# Patient Record
Sex: FEMALE | Race: WHITE | NOT HISPANIC OR LATINO | RURAL
[De-identification: names, ages, dates, MRNs, and addresses within clinical notes are randomized per-mention and may not be internally consistent; named-entity substitution may affect disease eponyms.]

---

## 2022-11-05 ENCOUNTER — HOSPITAL ENCOUNTER (EMERGENCY)
Facility: HOSPITAL | Age: 87
Discharge: HOME | End: 2022-11-05
Attending: EMERGENCY MEDICINE
Payer: MEDICARE

## 2022-11-05 VITALS
OXYGEN SATURATION: 97 % | BODY MASS INDEX: 27.23 KG/M2 | TEMPERATURE: 98 F | RESPIRATION RATE: 20 BRPM | DIASTOLIC BLOOD PRESSURE: 65 MMHG | SYSTOLIC BLOOD PRESSURE: 162 MMHG | WEIGHT: 148 LBS | HEIGHT: 62 IN | HEART RATE: 63 BPM

## 2022-11-05 DIAGNOSIS — S39.012A LUMBAR STRAIN, INITIAL ENCOUNTER: ICD-10-CM
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
## 2022-11-05 DIAGNOSIS — S80.10XA CONTUSION OF LOWER EXTREMITY, UNSPECIFIED LATERALITY, INITIAL ENCOUNTER: ICD-10-CM

## 2022-11-05 PROCEDURE — 99285 EMERGENCY DEPT VISIT HI MDM: CPT | Mod: 25

## 2022-11-05 PROCEDURE — 90715 TDAP VACCINE 7 YRS/> IM: CPT | Performed by: EMERGENCY MEDICINE

## 2022-11-05 PROCEDURE — 99283 EMERGENCY DEPT VISIT LOW MDM: CPT | Mod: ,,, | Performed by: EMERGENCY MEDICINE

## 2022-11-05 PROCEDURE — 99283 PR EMERGENCY DEPT VISIT,LEVEL III: ICD-10-PCS | Mod: ,,, | Performed by: EMERGENCY MEDICINE

## 2022-11-05 PROCEDURE — 63600175 PHARM REV CODE 636 W HCPCS: Performed by: EMERGENCY MEDICINE

## 2022-11-05 PROCEDURE — 25000003 PHARM REV CODE 250: Performed by: EMERGENCY MEDICINE

## 2022-11-05 PROCEDURE — 90471 IMMUNIZATION ADMIN: CPT | Performed by: EMERGENCY MEDICINE

## 2022-11-05 RX ADMIN — Medication 1 EACH: at 06:11

## 2022-11-05 RX ADMIN — TETANUS TOXOID, REDUCED DIPHTHERIA TOXOID AND ACELLULAR PERTUSSIS VACCINE, ADSORBED 0.5 ML: 5; 2.5; 8; 8; 2.5 SUSPENSION INTRAMUSCULAR at 05:11

## 2022-11-05 NOTE — DISCHARGE INSTRUCTIONS
TAKE IBUPROFEN OR ACETAMINOPHEN AS DIRECTED FOR PAIN.  FOLLOW UP IF SYMPTOMS PERSIST OR WORSEN OR OTHERWISE AS NEEDED.

## 2022-11-05 NOTE — ED TRIAGE NOTES
Fell on a rack at [place]. Pt is c/o left lower leg pain, back pain & a broken nail to the left thumb.

## 2022-11-05 NOTE — ED PROVIDER NOTES
Encounter Date: 11/5/2022    SCRIBE #1 NOTE: I, Vicky Roth, am scribing for, and in the presence of,  Good Rothman MD. I have scribed the entire note.     History     Chief Complaint   Patient presents with    Fall     89 y.o. female presents to the emergency department with complaints of mild back pain and a left leg abrasion. Patient stated she was in [place] when she tripped and fell on top of a rack. Patient is a diabetic. No other symptoms were reported.     The history is provided by the patient and a relative.   Review of patient's allergies indicates:  No Known Allergies  No past medical history on file.  No past surgical history on file.  No family history on file.     Review of Systems   Constitutional: Negative.    HENT: Negative.     Eyes: Negative.    Respiratory: Negative.     Cardiovascular: Negative.    Gastrointestinal: Negative.    Endocrine: Negative.    Genitourinary: Negative.    Musculoskeletal:  Positive for back pain (mild).   Skin:  Positive for wound (abrasion).   Allergic/Immunologic: Negative.    Neurological: Negative.    Hematological: Negative.    Psychiatric/Behavioral: Negative.     All other systems reviewed and are negative.    Physical Exam     Initial Vitals [11/05/22 1651]   BP Pulse Resp Temp SpO2   (!) 162/65 63 20 98.4 °F (36.9 °C) 97 %      MAP       --         Physical Exam    Vitals reviewed.  Constitutional: She appears well-developed and well-nourished. No distress.   HENT:   Head: Normocephalic.   Eyes: Conjunctivae are normal. Pupils are equal, round, and reactive to light.   Cardiovascular:  Normal rate, regular rhythm, normal heart sounds and intact distal pulses.           Pulmonary/Chest: Breath sounds normal.   Abdominal: Abdomen is soft. Bowel sounds are normal.   Musculoskeletal:      Thoracic back: Tenderness (mild to midline) present.      Comments: Swollen bruised area on left lower leg anteriorly  1x2cm skin tear       Neurological: She is alert and  oriented to person, place, and time.   Skin: Skin is warm and dry.   Psychiatric: She has a normal mood and affect.       ED Course   Procedures  Labs Reviewed - No data to display       Imaging Results              X-Ray Lumbar Spine Ap And Lateral (Final result)  Result time 11/05/22 17:43:13      Final result by Ned Chaves II, MD (11/05/22 17:43:13)                   Impression:      T12 and L4 compression fractures as described above.      Electronically signed by: Ned Chaves  Date:    11/05/2022  Time:    17:43               Narrative:    EXAMINATION:  XR LUMBAR SPINE AP AND LATERAL    CLINICAL HISTORY:  FALL;    COMPARISON:  None.    FINDINGS:  Compression fracture L4 present with height loss estimated 25%.  There is compression fracture of T12 estimated 25% height loss.  No other fracture is seen.  Remaining vertebral body heights and alignment are normal.  There is loss of disc space and degenerative change mild at multiple levels..  Moderate lower lumbar spine facet joint degenerative change is present.                                       CT Head Without Contrast (Final result)  Result time 11/05/22 17:38:14      Final result by Ned Chaves II, MD (11/05/22 17:38:14)                   Impression:      No evidence of acute injury or other acute process demonstrated.      Electronically signed by: Ned Chaves  Date:    11/05/2022  Time:    17:38               Narrative:    EXAMINATION:  CT HEAD WITHOUT CONTRAST    CLINICAL HISTORY:  Head trauma, moderate-severe;    TECHNIQUE:  Axial CT imaging of the brain is performed without contrast with 3 mm increments.    CT dose reduction technique used - Dose Rite and tube current modulation.    COMPARISON:  None available    FINDINGS:  No evidence of hemorrhage,  mass,  mass effect,  midline shift or acute infarct seen.  There is moderate diffuse cerebral atrophy.  There are areas of decreased density seen within the white matter.  Otherwise  the brain parenchyma attenuation and differentiation appears within normal limits. The ventricles and cisterns are normal in caliber.  No cranial or skull base abnormality is identified.                                       Medications   bacitracin-polymyxin B 500-10,000 unit/gram ointment (has no administration in time range)   Tdap (BOOSTRIX) vaccine injection 0.5 mL (0.5 mLs Intramuscular Given 11/5/22 2210)                Attending Attestation:           Physician Attestation for Scribe:  Physician Attestation Statement for Scribe #1: I, Good Rothman MD, reviewed documentation, as scribed by Vicky Roth in my presence, and it is both accurate and complete.                        Clinical Impression:   Final diagnoses:  [W19.XXXA] Fall, initial encounter (Primary)  [S39.012A] Lumbar strain, initial encounter  [S80.10XA] Contusion of lower extremity, unspecified laterality, initial encounter      ED Disposition Condition    Discharge Stable          ED Prescriptions    None       Follow-up Information       Follow up With Specialties Details Why Contact Info    PRIMARY CARE PROVIDER   As needed              Good Rothman MD  11/05/22 4882

## 2024-10-27 ENCOUNTER — HOSPITAL ENCOUNTER (INPATIENT)
Facility: HOSPITAL | Age: 89
LOS: 5 days | Discharge: NURSING, REHAB OR LONG-TERM CARE | DRG: 511 | End: 2024-11-01
Attending: EMERGENCY MEDICINE | Admitting: INTERNAL MEDICINE
Payer: MEDICARE

## 2024-10-27 DIAGNOSIS — S52.502A CLOSED FRACTURE OF DISTAL END OF LEFT RADIUS, UNSPECIFIED FRACTURE MORPHOLOGY, INITIAL ENCOUNTER: Primary | ICD-10-CM

## 2024-10-27 DIAGNOSIS — I25.810 CORONARY ARTERY DISEASE INVOLVING CORONARY BYPASS GRAFT OF NATIVE HEART WITHOUT ANGINA PECTORIS: ICD-10-CM

## 2024-10-27 DIAGNOSIS — S52.122B TYPE I OR II OPEN DISPLACED FRACTURE OF HEAD OF LEFT RADIUS, INITIAL ENCOUNTER: ICD-10-CM

## 2024-10-27 DIAGNOSIS — E11.65 TYPE 2 DIABETES MELLITUS WITH HYPERGLYCEMIA, WITHOUT LONG-TERM CURRENT USE OF INSULIN: ICD-10-CM
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
## 2024-10-27 DIAGNOSIS — Z01.818 PRE-OP EVALUATION: ICD-10-CM

## 2024-10-27 DIAGNOSIS — T14.90XA INJURY: ICD-10-CM

## 2024-10-27 DIAGNOSIS — I25.10 CAD (CORONARY ARTERY DISEASE): ICD-10-CM

## 2024-10-27 DIAGNOSIS — R07.9 CHEST PAIN: ICD-10-CM

## 2024-10-27 DIAGNOSIS — I10 PRIMARY HYPERTENSION: ICD-10-CM

## 2024-10-27 DIAGNOSIS — S52.90XA RADIAL FRACTURE: ICD-10-CM

## 2024-10-27 PROBLEM — S52.92XA FRACTURE OF LEFT RADIUS: Status: ACTIVE | Noted: 2024-10-27

## 2024-10-27 PROBLEM — E11.9 TYPE 2 DIABETES MELLITUS, WITHOUT LONG-TERM CURRENT USE OF INSULIN: Status: ACTIVE | Noted: 2024-10-27

## 2024-10-27 PROBLEM — S52.202A FRACTURE OF LEFT ULNA: Status: ACTIVE | Noted: 2024-10-27

## 2024-10-27 LAB
ALBUMIN SERPL BCP-MCNC: 3.3 G/DL (ref 3.5–5)
ALBUMIN/GLOB SERPL: 1 {RATIO}
ALP SERPL-CCNC: 87 U/L (ref 55–142)
ALT SERPL W P-5'-P-CCNC: 19 U/L (ref 13–56)
ANION GAP SERPL CALCULATED.3IONS-SCNC: 7 MMOL/L (ref 7–16)
APTT PPP: 31.3 SECONDS (ref 25.2–37.3)
AST SERPL W P-5'-P-CCNC: 22 U/L (ref 15–37)
BASOPHILS # BLD AUTO: 0.01 K/UL (ref 0–0.2)
BASOPHILS NFR BLD AUTO: 0.2 % (ref 0–1)
BILIRUB SERPL-MCNC: 0.5 MG/DL (ref ?–1.2)
BILIRUB UR QL STRIP: NEGATIVE
BUN SERPL-MCNC: 17 MG/DL (ref 7–18)
BUN/CREAT SERPL: 16 (ref 6–20)
CALCIUM SERPL-MCNC: 8.8 MG/DL (ref 8.5–10.1)
CHLORIDE SERPL-SCNC: 102 MMOL/L (ref 98–107)
CLARITY UR: ABNORMAL
CO2 SERPL-SCNC: 32 MMOL/L (ref 21–32)
COLOR UR: COLORLESS
CREAT SERPL-MCNC: 1.08 MG/DL (ref 0.55–1.02)
DIFFERENTIAL METHOD BLD: ABNORMAL
EGFR (NO RACE VARIABLE) (RUSH/TITUS): 48 ML/MIN/1.73M2
EOSINOPHIL # BLD AUTO: 0.02 K/UL (ref 0–0.5)
EOSINOPHIL NFR BLD AUTO: 0.3 % (ref 1–4)
ERYTHROCYTE [DISTWIDTH] IN BLOOD BY AUTOMATED COUNT: 12.6 % (ref 11.5–14.5)
GLOBULIN SER-MCNC: 3.2 G/DL (ref 2–4)
GLUCOSE SERPL-MCNC: 300 MG/DL (ref 74–106)
GLUCOSE UR STRIP-MCNC: 1000 MG/DL
HCT VFR BLD AUTO: 36.7 % (ref 38–47)
HGB BLD-MCNC: 11.7 G/DL (ref 12–16)
IMM GRANULOCYTES # BLD AUTO: 0.02 K/UL (ref 0–0.04)
IMM GRANULOCYTES NFR BLD: 0.3 % (ref 0–0.4)
INR BLD: 1.1
KETONES UR STRIP-SCNC: NEGATIVE MG/DL
LEUKOCYTE ESTERASE UR QL STRIP: NEGATIVE
LYMPHOCYTES # BLD AUTO: 0.97 K/UL (ref 1–4.8)
LYMPHOCYTES NFR BLD AUTO: 15.8 % (ref 27–41)
MCH RBC QN AUTO: 28.5 PG (ref 27–31)
MCHC RBC AUTO-ENTMCNC: 31.9 G/DL (ref 32–36)
MCV RBC AUTO: 89.3 FL (ref 80–96)
MONOCYTES # BLD AUTO: 0.32 K/UL (ref 0–0.8)
MONOCYTES NFR BLD AUTO: 5.2 % (ref 2–6)
MPC BLD CALC-MCNC: 10 FL (ref 9.4–12.4)
NEUTROPHILS # BLD AUTO: 4.81 K/UL (ref 1.8–7.7)
NEUTROPHILS NFR BLD AUTO: 78.2 % (ref 53–65)
NITRITE UR QL STRIP: NEGATIVE
NRBC # BLD AUTO: 0 X10E3/UL
NRBC, AUTO (.00): 0 %
PH UR STRIP: 8 PH UNITS
PLATELET # BLD AUTO: 139 K/UL (ref 150–400)
POTASSIUM SERPL-SCNC: 4.6 MMOL/L (ref 3.5–5.1)
PROT SERPL-MCNC: 6.5 G/DL (ref 6.4–8.2)
PROT UR QL STRIP: 20
PROTHROMBIN TIME: 14.1 SECONDS (ref 11.7–14.7)
RBC # BLD AUTO: 4.11 M/UL (ref 4.2–5.4)
RBC # UR STRIP: NEGATIVE /UL
SODIUM SERPL-SCNC: 136 MMOL/L (ref 136–145)
SP GR UR STRIP: 1.01
UROBILINOGEN UR STRIP-ACNC: NORMAL MG/DL
WBC # BLD AUTO: 6.15 K/UL (ref 4.5–11)

## 2024-10-27 PROCEDURE — 36415 COLL VENOUS BLD VENIPUNCTURE: CPT | Performed by: EMERGENCY MEDICINE

## 2024-10-27 PROCEDURE — 63600175 PHARM REV CODE 636 W HCPCS: Performed by: FAMILY MEDICINE

## 2024-10-27 PROCEDURE — 81003 URINALYSIS AUTO W/O SCOPE: CPT | Performed by: EMERGENCY MEDICINE

## 2024-10-27 PROCEDURE — 96375 TX/PRO/DX INJ NEW DRUG ADDON: CPT

## 2024-10-27 PROCEDURE — G0390 TRAUMA RESPONS W/HOSP CRITI: HCPCS | Performed by: EMERGENCY MEDICINE

## 2024-10-27 PROCEDURE — 25000003 PHARM REV CODE 250: Performed by: FAMILY MEDICINE

## 2024-10-27 PROCEDURE — 96374 THER/PROPH/DIAG INJ IV PUSH: CPT

## 2024-10-27 PROCEDURE — 99285 EMERGENCY DEPT VISIT HI MDM: CPT | Mod: 25

## 2024-10-27 PROCEDURE — 63600175 PHARM REV CODE 636 W HCPCS

## 2024-10-27 PROCEDURE — 11000001 HC ACUTE MED/SURG PRIVATE ROOM

## 2024-10-27 PROCEDURE — 93005 ELECTROCARDIOGRAM TRACING: CPT

## 2024-10-27 PROCEDURE — 85025 COMPLETE CBC W/AUTO DIFF WBC: CPT | Performed by: EMERGENCY MEDICINE

## 2024-10-27 PROCEDURE — 25000003 PHARM REV CODE 250

## 2024-10-27 PROCEDURE — 93010 ELECTROCARDIOGRAM REPORT: CPT | Mod: ,,, | Performed by: INTERNAL MEDICINE

## 2024-10-27 PROCEDURE — 99222 1ST HOSP IP/OBS MODERATE 55: CPT | Mod: AI,,, | Performed by: INTERNAL MEDICINE

## 2024-10-27 PROCEDURE — 63600175 PHARM REV CODE 636 W HCPCS: Performed by: EMERGENCY MEDICINE

## 2024-10-27 PROCEDURE — 85730 THROMBOPLASTIN TIME PARTIAL: CPT | Performed by: EMERGENCY MEDICINE

## 2024-10-27 PROCEDURE — 80053 COMPREHEN METABOLIC PANEL: CPT | Performed by: EMERGENCY MEDICINE

## 2024-10-27 PROCEDURE — 85610 PROTHROMBIN TIME: CPT | Performed by: EMERGENCY MEDICINE

## 2024-10-27 RX ORDER — IBUPROFEN 200 MG
16 TABLET ORAL
Status: DISCONTINUED | OUTPATIENT
Start: 2024-10-27 | End: 2024-11-01 | Stop reason: HOSPADM

## 2024-10-27 RX ORDER — SODIUM CHLORIDE 0.9 % (FLUSH) 0.9 %
10 SYRINGE (ML) INJECTION EVERY 12 HOURS PRN
Status: CANCELLED | OUTPATIENT
Start: 2024-10-27

## 2024-10-27 RX ORDER — PROPOFOL 10 MG/ML
50 VIAL (ML) INTRAVENOUS ONCE
Status: COMPLETED | OUTPATIENT
Start: 2024-10-27 | End: 2024-10-27

## 2024-10-27 RX ORDER — PROPOFOL 10 MG/ML
INJECTION, EMULSION INTRAVENOUS
Status: DISCONTINUED
Start: 2024-10-27 | End: 2024-10-27 | Stop reason: WASHOUT

## 2024-10-27 RX ORDER — FUROSEMIDE 20 MG/1
20 TABLET ORAL DAILY
Status: DISCONTINUED | OUTPATIENT
Start: 2024-10-28 | End: 2024-11-01 | Stop reason: HOSPADM

## 2024-10-27 RX ORDER — HYDRALAZINE HYDROCHLORIDE 25 MG/1
25 TABLET, FILM COATED ORAL 3 TIMES DAILY
Status: DISCONTINUED | OUTPATIENT
Start: 2024-10-28 | End: 2024-10-29

## 2024-10-27 RX ORDER — INSULIN ASPART 100 [IU]/ML
INJECTION, SUSPENSION SUBCUTANEOUS
COMMUNITY
Start: 2024-09-09

## 2024-10-27 RX ORDER — ONDANSETRON HYDROCHLORIDE 2 MG/ML
4 INJECTION, SOLUTION INTRAVENOUS EVERY 8 HOURS PRN
Status: DISCONTINUED | OUTPATIENT
Start: 2024-10-27 | End: 2024-11-01 | Stop reason: HOSPADM

## 2024-10-27 RX ORDER — NALOXONE HCL 0.4 MG/ML
0.02 VIAL (ML) INJECTION
Status: CANCELLED | OUTPATIENT
Start: 2024-10-27

## 2024-10-27 RX ORDER — HYDRALAZINE HYDROCHLORIDE 25 MG/1
25 TABLET, FILM COATED ORAL 3 TIMES DAILY
COMMUNITY
Start: 2024-09-16

## 2024-10-27 RX ORDER — HYDROCODONE BITARTRATE AND ACETAMINOPHEN 5; 325 MG/1; MG/1
1 TABLET ORAL
Status: COMPLETED | OUTPATIENT
Start: 2024-10-27 | End: 2024-10-27

## 2024-10-27 RX ORDER — POTASSIUM CHLORIDE 750 MG/1
10 CAPSULE, EXTENDED RELEASE ORAL DAILY
COMMUNITY
Start: 2024-09-12

## 2024-10-27 RX ORDER — CLOPIDOGREL BISULFATE 75 MG/1
75 TABLET ORAL DAILY
COMMUNITY
Start: 2024-09-05

## 2024-10-27 RX ORDER — FUROSEMIDE 20 MG/1
20 TABLET ORAL DAILY
COMMUNITY
Start: 2024-10-17

## 2024-10-27 RX ORDER — ATORVASTATIN CALCIUM 40 MG/1
40 TABLET, FILM COATED ORAL NIGHTLY
Status: DISCONTINUED | OUTPATIENT
Start: 2024-10-27 | End: 2024-11-01 | Stop reason: HOSPADM

## 2024-10-27 RX ORDER — TALC
6 POWDER (GRAM) TOPICAL NIGHTLY PRN
Status: DISCONTINUED | OUTPATIENT
Start: 2024-10-27 | End: 2024-11-01 | Stop reason: HOSPADM

## 2024-10-27 RX ORDER — INSULIN ASPART 100 [IU]/ML
0-5 INJECTION, SOLUTION INTRAVENOUS; SUBCUTANEOUS
Status: DISCONTINUED | OUTPATIENT
Start: 2024-10-27 | End: 2024-11-01 | Stop reason: HOSPADM

## 2024-10-27 RX ORDER — CARVEDILOL 12.5 MG/1
12.5 TABLET ORAL 2 TIMES DAILY
Status: DISCONTINUED | OUTPATIENT
Start: 2024-10-28 | End: 2024-11-01 | Stop reason: HOSPADM

## 2024-10-27 RX ORDER — PROPOFOL 10 MG/ML
VIAL (ML) INTRAVENOUS
Status: COMPLETED
Start: 2024-10-27 | End: 2024-10-27

## 2024-10-27 RX ORDER — GLUCAGON 1 MG
1 KIT INJECTION
Status: CANCELLED | OUTPATIENT
Start: 2024-10-27

## 2024-10-27 RX ORDER — IBUPROFEN 200 MG
16 TABLET ORAL
Status: CANCELLED | OUTPATIENT
Start: 2024-10-27

## 2024-10-27 RX ORDER — IBUPROFEN 200 MG
24 TABLET ORAL
Status: CANCELLED | OUTPATIENT
Start: 2024-10-27

## 2024-10-27 RX ORDER — CARVEDILOL 12.5 MG/1
12.5 TABLET ORAL 2 TIMES DAILY
COMMUNITY
Start: 2024-09-05

## 2024-10-27 RX ORDER — SODIUM CHLORIDE 9 MG/ML
INJECTION, SOLUTION INTRAVENOUS
Status: COMPLETED
Start: 2024-10-27 | End: 2024-10-27

## 2024-10-27 RX ORDER — ACETAMINOPHEN 325 MG/1
650 TABLET ORAL EVERY 4 HOURS PRN
Status: DISCONTINUED | OUTPATIENT
Start: 2024-10-27 | End: 2024-11-01 | Stop reason: HOSPADM

## 2024-10-27 RX ORDER — GLUCAGON 1 MG
1 KIT INJECTION
Status: DISCONTINUED | OUTPATIENT
Start: 2024-10-27 | End: 2024-11-01 | Stop reason: HOSPADM

## 2024-10-27 RX ORDER — FENTANYL CITRATE 50 UG/ML
25 INJECTION, SOLUTION INTRAMUSCULAR; INTRAVENOUS
Status: COMPLETED | OUTPATIENT
Start: 2024-10-27 | End: 2024-10-27

## 2024-10-27 RX ORDER — MORPHINE SULFATE 2 MG/ML
2 INJECTION, SOLUTION INTRAMUSCULAR; INTRAVENOUS
Status: COMPLETED | OUTPATIENT
Start: 2024-10-27 | End: 2024-10-27

## 2024-10-27 RX ORDER — ROSUVASTATIN CALCIUM 40 MG/1
20 TABLET, COATED ORAL NIGHTLY
COMMUNITY
Start: 2024-09-16

## 2024-10-27 RX ORDER — SODIUM CHLORIDE 0.9 % (FLUSH) 0.9 %
10 SYRINGE (ML) INJECTION EVERY 12 HOURS PRN
Status: DISCONTINUED | OUTPATIENT
Start: 2024-10-27 | End: 2024-11-01 | Stop reason: HOSPADM

## 2024-10-27 RX ORDER — IBUPROFEN 200 MG
24 TABLET ORAL
Status: DISCONTINUED | OUTPATIENT
Start: 2024-10-27 | End: 2024-11-01 | Stop reason: HOSPADM

## 2024-10-27 RX ORDER — BISACODYL 5 MG
5 TABLET, DELAYED RELEASE (ENTERIC COATED) ORAL DAILY PRN
COMMUNITY

## 2024-10-27 RX ORDER — NALOXONE HCL 0.4 MG/ML
0.02 VIAL (ML) INJECTION
Status: DISCONTINUED | OUTPATIENT
Start: 2024-10-27 | End: 2024-11-01 | Stop reason: HOSPADM

## 2024-10-27 RX ORDER — CEFAZOLIN 2 G/1
2 INJECTION, POWDER, FOR SOLUTION INTRAMUSCULAR; INTRAVENOUS
Status: COMPLETED | OUTPATIENT
Start: 2024-10-27 | End: 2024-10-27

## 2024-10-27 RX ORDER — ASPIRIN 81 MG/1
81 TABLET ORAL DAILY
COMMUNITY

## 2024-10-27 RX ORDER — HYDROCODONE BITARTRATE AND ACETAMINOPHEN 5; 325 MG/1; MG/1
1 TABLET ORAL EVERY 6 HOURS PRN
Status: DISCONTINUED | OUTPATIENT
Start: 2024-10-27 | End: 2024-10-28

## 2024-10-27 RX ADMIN — CEFAZOLIN 2 G: 2 INJECTION, POWDER, FOR SOLUTION INTRAMUSCULAR; INTRAVENOUS at 05:10

## 2024-10-27 RX ADMIN — HYDROCODONE BITARTRATE AND ACETAMINOPHEN 1 TABLET: 5; 325 TABLET ORAL at 07:10

## 2024-10-27 RX ADMIN — PROPOFOL 50 MG: 10 INJECTION, EMULSION INTRAVENOUS at 05:10

## 2024-10-27 RX ADMIN — FENTANYL CITRATE 25 MCG: 50 INJECTION, SOLUTION INTRAMUSCULAR; INTRAVENOUS at 06:10

## 2024-10-27 RX ADMIN — ATORVASTATIN CALCIUM 40 MG: 40 TABLET, FILM COATED ORAL at 09:10

## 2024-10-27 RX ADMIN — Medication 50 MG: at 05:10

## 2024-10-27 RX ADMIN — MORPHINE SULFATE 2 MG: 2 INJECTION, SOLUTION INTRAMUSCULAR; INTRAVENOUS at 07:10

## 2024-10-27 NOTE — ED PROVIDER NOTES
Encounter Date: 10/27/2024    SCRIBE #1 NOTE: I, Lexie Flores, am scribing for, and in the presence of,  Good Rothman MD.       History     Chief Complaint   Patient presents with    Fall    Arm Injury     Patient is an 89 y.o. Female that arrives to the ED via EMS for a fall with c/o Arm injury. The patient reports she tripped and fell over her 2 y.o. Great grandchild. Patient hit her head on the [place] [employer] and hurt her left arm. She complains of pain on back of her head. The EMS reports patient is on Plavix. The patient doesn't have any other problems to reports at this time.     The history is provided by the patient and the EMS personnel. No  was used.     Review of patient's allergies indicates:  No Known Allergies  Past Medical History:   Diagnosis Date    Anticoagulant long-term use     plalvix    CHF (congestive heart failure)     Diabetes mellitus     High cholesterol     being treated with ezetimibe and rosuvastatin    Hypertension      Past Surgical History:   Procedure Laterality Date    CORONARY ARTERY BYPASS GRAFT      twice    DEBRIDEMENT, SKIN, FASCIA, MUSCLE, AND BONE, OPEN FRACTURE SITE Left 10/28/2024    Procedure: DEBRIDEMENT, SKIN, FASCIA, MUSCLE, AND BONE, OPEN FRACTURE SITE;  Surgeon: Nabil Mckinley MD;  Location: [facility] OR;  Service: Orthopedics;  Laterality: Left;    HYSTERECTOMY      OPEN REDUCTION AND INTERNAL FIXATION (ORIF) OF FRACTURE OF RADIUS Left 10/28/2024    Procedure: ORIF, FRACTURE, RADIUS;  Surgeon: Nabil Mckinley MD;  Location: [facility] OR;  Service: Orthopedics;  Laterality: Left;     No family history on file.  Social History     Tobacco Use    Smoking status: Never    Smokeless tobacco: Never   Substance Use Topics    Alcohol use: Never    Drug use: Never     Review of Systems   Constitutional:  Negative for fever.   Respiratory:  Negative for cough, chest tightness and shortness of breath.    Cardiovascular:  Negative for chest  pain and leg swelling.   Gastrointestinal:  Negative for abdominal pain, diarrhea, nausea and vomiting.       Physical Exam     Initial Vitals [10/27/24 1558]   BP Pulse Resp Temp SpO2   (!) 195/80 (!) 57 20 97.6 °F (36.4 °C) (!) 88 %      MAP       --         Physical Exam    Nursing note and vitals reviewed.  Constitutional: She appears well-developed and well-nourished.   HENT:   Head: Normocephalic.   Patient fell and hit the back of her head on a [place] [employer].   Eyes: Conjunctivae and EOM are normal. Pupils are equal, round, and reactive to light.   Neck: Neck supple.   Normal range of motion.  Cardiovascular:  Normal rate, regular rhythm, normal heart sounds and intact distal pulses.           Pulmonary/Chest: Breath sounds normal.   Abdominal: Abdomen is soft. Bowel sounds are normal.   Musculoskeletal:         General: Normal range of motion.      Cervical back: Normal range of motion and neck supple.     Neurological: She is alert and oriented to person, place, and time. She has normal strength.   Skin: Skin is warm and dry. Capillary refill takes less than 2 seconds.   Psychiatric: She has a normal mood and affect. Thought content normal.         ED Course   Procedures  Labs Reviewed   COMPREHENSIVE METABOLIC PANEL - Abnormal       Result Value    Sodium 136      Potassium 4.6      Chloride 102      CO2 32      Anion Gap 7      Glucose 300 (*)     BUN 17      Creatinine 1.08 (*)     BUN/Creatinine Ratio 16      Calcium 8.8      Total Protein 6.5      Albumin 3.3 (*)     Globulin 3.2      A/G Ratio 1.0      Bilirubin, Total 0.5      Alk Phos 87      ALT 19      AST 22      eGFR 48 (*)    CBC WITH DIFFERENTIAL - Abnormal    WBC 6.15      RBC 4.11 (*)     Hemoglobin 11.7 (*)     Hematocrit 36.7 (*)     MCV 89.3      MCH 28.5      MCHC 31.9 (*)     RDW 12.6      Platelet Count 139 (*)     MPV 10.0      Neutrophils % 78.2 (*)     Lymphocytes % 15.8 (*)     Monocytes % 5.2      Eosinophils % 0.3 (*)      Basophils % 0.2      Immature Granulocytes % 0.3      nRBC, Auto 0.0      Neutrophils, Abs 4.81      Lymphocytes, Absolute 0.97 (*)     Monocytes, Absolute 0.32      Eosinophils, Absolute 0.02      Basophils, Absolute 0.01      Immature Granulocytes, Absolute 0.02      nRBC, Absolute 0.00      Diff Type Auto     URINALYSIS, REFLEX TO URINE CULTURE - Abnormal    Color, UA Colorless      Clarity, UA Turbid      pH, UA 8.0      Leukocytes, UA Negative      Nitrites, UA Negative      Protein, UA 20 (*)     Glucose, UA 1000 (*)     Ketones, UA Negative      Urobilinogen, UA Normal      Bilirubin, UA Negative      Blood, UA Negative      Specific Gravity, UA 1.008     COMPREHENSIVE METABOLIC PANEL - Abnormal    Sodium 137      Potassium 4.5      Chloride 105      CO2 30      Anion Gap 7      Glucose 273 (*)     BUN 16      Creatinine 1.02      BUN/Creatinine Ratio 16      Calcium 8.5      Total Protein 5.9 (*)     Albumin 2.8 (*)     Globulin 3.1      A/G Ratio 0.9      Bilirubin, Total 0.3      Alk Phos 70      ALT 17      AST 22      eGFR 51 (*)    NT-PRO NATRIURETIC PEPTIDE - Abnormal    ProBNP 1,848 (*)    HEMOGLOBIN A1C - Abnormal    Hemoglobin A1C 7.7 (*)     Estimated Average Glucose 174     CBC WITH DIFFERENTIAL - Abnormal    WBC 5.18      RBC 3.70 (*)     Hemoglobin 10.9 (*)     Hematocrit 33.3 (*)     MCV 90.0      MCH 29.5      MCHC 32.7      RDW 12.6      Platelet Count 137 (*)     MPV 10.2      Neutrophils % 63.8      Lymphocytes % 26.3 (*)     Monocytes % 8.7 (*)     Eosinophils % 0.6 (*)     Basophils % 0.2      Immature Granulocytes % 0.4      nRBC, Auto 0.0      Neutrophils, Abs 3.31      Lymphocytes, Absolute 1.36      Monocytes, Absolute 0.45      Eosinophils, Absolute 0.03      Basophils, Absolute 0.01      Immature Granulocytes, Absolute 0.02      nRBC, Absolute 0.00      Diff Type Auto     POCT GLUCOSE MONITORING CONTINUOUS - Abnormal    POC Glucose 243 (*)    POCT GLUCOSE MONITORING CONTINUOUS -  Abnormal    POC Glucose 220 (*)    APTT - Normal    PTT 31.3     PROTIME-INR - Normal    PT 14.1      INR 1.10     CBC W/ AUTO DIFFERENTIAL    Narrative:     The following orders were created for panel order CBC auto differential.  Procedure                               Abnormality         Status                     ---------                               -----------         ------                     CBC with Differential[[id number]]       Abnormal            Final result                 Please view results for these tests on the individual orders.   CBC W/ AUTO DIFFERENTIAL    Narrative:     The following orders were created for panel order CBC with Automated Differential.  Procedure                               Abnormality         Status                     ---------                               -----------         ------                     CBC with Differential[[id number]]       Abnormal            Final result                 Please view results for these tests on the individual orders.        ECG Results              EKG 12-lead (Final result)        Collection Time Result Time QRS Duration OHS QTC Calculation    10/28/24 08:56:35 10/29/24 18:38:23 134 479                     Final result by Interface, Lab In [facility] (10/29/24 18:38:29)                   Narrative:    Test Reason : I25.10,    Vent. Rate : 057 BPM     Atrial Rate : 000 BPM     P-R Int : 168 ms          QRS Dur : 134 ms      QT Int : 484 ms       P-R-T Axes : 045 114 041 degrees     QTc Int : 479 ms    Sinus rhythm  Right axis deviation  Right bundle branch block  Low QRS voltages in precordial leads  Abnormal ECG    Confirmed by Kwasi PRASAD, Celina URachid ([id number]) on 10/29/2024 6:38:20 PM    Referred By: [email or web address]   SELF           Confirmed By:Celina Villalpando MD                                     EKG 12-lead (Final result)        Collection Time Result Time QRS Duration OHS QTC Calculation    10/27/24 18:09:17 10/29/24 18:38:57 138 490                      Final result by Interface, Lab In [facility] (10/29/24 18:39:01)                   Narrative:    Test Reason : Z01.818,    Vent. Rate : 060 BPM     Atrial Rate : 000 BPM     P-R Int : 168 ms          QRS Dur : 138 ms      QT Int : 490 ms       P-R-T Axes : 074 114 022 degrees     QTc Int : 490 ms    Sinus rhythm  Right axis deviation  Right bundle branch block  Inferior T wave abnormality is nonspecific  Low QRS voltages in precordial leads  Abnormal ECG    Confirmed by Kwasi PRASAD, Celina MARTINEZ ([id number]) on 10/29/2024 6:38:56 PM    Referred By: MANDI   SELF           Confirmed By:Celina Villalpando MD                                  Imaging Results              FL Limited Non-Billable (Final result)  Result time 10/28/24 14:39:59      Final result by Access, Silent  (10/28/24 14:39:59)                   Narrative:    See Physician's Notes for results.     IMPRESSION: See Physician's Notes for results           This procedure was auto-finalized  See Physicians Notes for your results.                                     X-Ray Wrist Complete Left (Final result)  Result time 10/27/24 18:50:37      Final result by Alirio Jurado MD (10/27/24 18:50:37)                   Impression:      As above.      Electronically signed by: Alirio Jurado MD  Date:    10/27/2024  Time:    18:50               Narrative:    EXAMINATION:  XR WRIST COMPLETE 3 VIEWS LEFT    CLINICAL HISTORY:  Encounter for other preprocedural examination    TECHNIQUE:  PA, lateral, and oblique views of the left wrist were performed.    COMPARISON:  10/27/2024.    FINDINGS:  There has been interval attempted external reduction of previous complex fractures of the left distal radius and ulna.  There is improvement in the alignment of the displaced fracture components.  There is no new fracture.  There are regions of soft tissue laceration along the thumb.  Overlying cast makes detailed evaluation difficult.                                       X-Ray Chest AP  Portable (Final result)  Result time 10/27/24 18:46:07      Final result by Alirio Jurado MD (10/27/24 18:46:07)                   Impression:      Borderline cardiomegaly.  No evidence of failure.      Electronically signed by: Alirio Jurado MD  Date:    10/27/2024  Time:    18:46               Narrative:    EXAMINATION:  XR CHEST AP PORTABLE    CLINICAL HISTORY:  Encounter for other preprocedural examination    TECHNIQUE:  Single frontal view of the chest was performed.    COMPARISON:  None    FINDINGS:  There are postoperative changes of median sternotomy.  The sternal wires are intact.  The trachea is unremarkable.  The cardiac silhouette is borderline enlarged.  There is no evidence of free air beneath the hemidiaphragms.  There are no pleural effusions.  There is no evidence of a pneumothorax.  There is no evidence of pneumomediastinum.  No airspace opacity is present.  There are degenerative changes in the osseous structures.                                       X-Ray Ankle Complete Left (Final result)  Result time 10/27/24 17:27:10      Final result by Pb Quick MD (10/27/24 17:27:10)                   Impression:      1. Irregular ossification about the distal most aspect of the medial malleolus suggests sequela of prior injury given no overlying edema however correlation with any focal tenderness is recommended.      Electronically signed by: Pb Quick MD  Date:    10/27/2024  Time:    17:27               Narrative:    EXAMINATION:  XR ANKLE COMPLETE 3 VIEW LEFT    CLINICAL HISTORY:  Unspecified fall, initial encounter    TECHNIQUE:  AP, lateral and oblique views of the left ankle were performed.    COMPARISON:  None    FINDINGS:  Three views left ankle.    There is osteopenia.  There is edema about the lower leg.  There is cortical irregularity about the distal most aspect of the medial malleolus, likely reflecting ossicle or sequela of prior injury.  No overlying edema.  The ankle mortise  is intact.  There are degenerative changes of the calcaneus.  There is vascular calcification.  Surgical change noted of the soft tissues.                                       X-Ray Wrist Complete Left (Final result)  Result time 10/27/24 17:28:13      Final result by Pb Quick MD (10/27/24 17:28:13)                   Impression:      1. Complex fractures of the distal radius and ulna as described noting superimposed laceration.  Correlation is needed to exclude open injury.      Electronically signed by: Pb Quick MD  Date:    10/27/2024  Time:    17:28               Narrative:    EXAMINATION:  XR WRIST COMPLETE 3 VIEWS LEFT    CLINICAL HISTORY:  Injury, unspecified, initial encounter    TECHNIQUE:  PA, lateral, and oblique views of the left wrist were performed.    COMPARISON:  None    FINDINGS:  Three views left wrist.    There are impacted comminuted and displaced fractures of the distal radius and ulna.  There is diffuse edema about the wrist.  Subcutaneous emphysema suggest superimposed laceration.  Several fracture fragments lie about the fracture planes.  There is vascular calcification.                                       CT Cervical Spine Without Contrast (Final result)  Result time 10/27/24 17:23:39      Final result by Alirio Jurado MD (10/27/24 17:23:39)                   Impression:      No evidence of acute fracture or traumatic malalignment of the cervical spine.    Multilevel degenerative changes in the cervical spine.      Electronically signed by: Alirio Jurado MD  Date:    10/27/2024  Time:    17:23               Narrative:    EXAMINATION:  CT CERVICAL SPINE WITHOUT CONTRAST    CLINICAL HISTORY:  Polytrauma, blunt;    TECHNIQUE:  Low dose axial images, sagittal and coronal reformations were performed though the cervical spine.  Contrast was not administered.    COMPARISON:  None    FINDINGS:  The craniocervical junction is intact.  The predental space is maintained.  No  prevertebral soft tissue swelling is identified.    The cervical alignment is maintained.  There is demineralization of the osseous structures.  The vertebral body heights are maintained.  The C1 ring is intact.  The occipital condyles are within normal limits.  There is hypertrophy of the posterior elements.    There is intervertebral disc space narrowing at multiple levels.  There is variable degree of spinal canal and neural foraminal narrowing.    There is no evidence of an acute fracture or listhesis of the cervical spine.    There are calcifications in the neck vessels.  There are calcifications in the thyroid gland.  There are fibrotic changes in the lung apices.  No evidence of a pickle pneumothorax.                                       CT Head Without Contrast (Final result)  Result time 10/27/24 16:24:56      Final result by Alirio Jurado MD (10/27/24 16:24:56)                   Impression:      No acute intracranial process.    Changes of chronic vessel ischemic disease and cerebral volume loss.      Electronically signed by: Alirio Jurado MD  Date:    10/27/2024  Time:    16:24               Narrative:    EXAMINATION:  CT HEAD WITHOUT CONTRAST    CLINICAL HISTORY:  Head trauma, minor (Age >= 65y);    TECHNIQUE:  Low dose axial images were obtained through the head.  Coronal and sagittal reformations were also performed. Contrast was not administered.    COMPARISON:  CT head dated 11/05/2022.    FINDINGS:  The subcutaneous tissues are unremarkable.  The bony calvarium is intact.  The paranasal sinuses are unremarkable.  The mastoid air cells are clear.  There are postoperative changes in the globes.    The craniocervical junction is intact.  The sellar and parasellar structures are unremarkable.  There is no evidence of intracranial hemorrhage.  There are no extra-axial fluid collections.    The ventricles and sulci are prominent, consistent cerebral volume loss.  There is no evidence of a cephalus.   There are extensive hypodensities within the periventricular and subcortical white matter.  The gray-white differentiation is maintained.  There is no dense vessel sign.  There is no evidence of mass effect.                                       Medications   meperidine (PF) injection 25 mg (has no administration in time range)   ceFAZolin SolR 2 g (2 g Intravenous Given 10/27/24 1700)   0.9% NaCl infusion (  Return to Cabinet 10/27/24 1816)   propofol (DIPRIVAN) 10 mg/mL IVP (50 mg Intravenous Given 10/27/24 1729)   fentaNYL injection 25 mcg (25 mcg Intravenous Given 10/27/24 1826)   morphine injection 2 mg (2 mg Intravenous Given 10/27/24 1921)   HYDROcodone-acetaminophen 5-325 mg per tablet 1 tablet (1 tablet Oral Given 10/27/24 1921)   morphine injection 2 mg (2 mg Intravenous Given 10/28/24 0409)   ceFAZolin injection 1 g (1 g Intravenous Given 10/28/24 1450)   morphine injection 2 mg (2 mg Intravenous Given 10/28/24 1128)     Medical Decision Making  FALL.  WRIST PAIN.    DDX:  CONTUSION VS FRACTURE +/- OTHER INJURY    ADMIT    Amount and/or Complexity of Data Reviewed  Labs: ordered. Decision-making details documented in ED Course.  Radiology: ordered. Decision-making details documented in ED Course.  ECG/medicine tests: ordered. Decision-making details documented in ED Course.  Discussion of management or test interpretation with external provider(s): DISCUSSED WITH ORTHO AND WITH ADMITTING SERVICE.      Risk  Prescription drug management.  Decision regarding hospitalization.              Attending Attestation:           Physician Attestation for Scribe:  Physician Attestation Statement for Scribe #1: I, Good Rothman MD, reviewed documentation, as scribed by Lexie Flores in my presence, and it is both accurate and complete.                                    Clinical Impression:  Final diagnoses:  [T14.90XA] Injury  [W19.XXXA] Fall  [Z01.818] Pre-op evaluation  [S52.90XA] Radial fracture          ED  Disposition Condition    Admit                 Good Rothman MD  11/08/24 7274

## 2024-10-27 NOTE — PHARMACY MED REC
"Admission Medication History     The home medication history was taken by Mariam Castro.    You may go to "Admission" then "Reconcile Home Medications" tabs to review and/or act upon these items.     The home medication list has been updated by the Pharmacy department.   Please read ALL comments highlighted in yellow.   Please address this information as you see fit.    Feel free to contact us if you have any questions or require assistance.    The following medications were added:  Aspirin 81 mg  Bisacodyl 5 mg  Carvedilol 12.5 mg  Clopidogrel 75 mg  Ezetimibe 10 mg  Furosemide 20 mg  Hydralazine 25 mg  Novolog 70-30 mg  Potassium chloride 10 mEq  Rosuvastatin 20 mg          Medications listed below were obtained from: Patient/family, Medications brought from home, Analytic software- Tap[employer], and Medical records  (Not in a hospital admission)        Current Outpatient Medications on File Prior to Encounter   Medication Sig Dispense Refill Last Dose/Taking    aspirin (ECOTRIN) 81 MG EC tablet Take 81 mg by mouth once daily.   10/27/2024    bisacodyL (DULCOLAX) 5 mg EC tablet Take 5 mg by mouth daily as needed for Constipation.   10/26/2024    carvediloL (COREG) 12.5 MG tablet Take 12.5 mg by mouth 2 (two) times daily.   10/27/2024    clopidogreL (PLAVIX) 75 mg tablet Take 75 mg by mouth once daily.   10/27/2024    ezetimibe (ZETIA) 10 mg tablet Take 10 mg by mouth once daily.   10/27/2024    furosemide (LASIX) 20 MG tablet Take 20 mg by mouth once daily.   10/26/2024 Morning    hydrALAZINE (APRESOLINE) 25 MG tablet Take 25 mg by mouth 3 (three) times daily.   10/27/2024    NOVOLOG MIX 70-30FLEXPEN U-100 100 unit/mL (70-30) InPn pen Inject into the skin.   Inject ten Units under the skin in the morning and ten Units in the evening. Inject with meals.   10/27/2024    rosuvastatin (CRESTOR) 40 MG Tab Take 20 mg by mouth every evening.   10/26/2024    potassium chloride (MICRO-K) 10 MEQ CpSR Take 10 mEq by mouth once " daily.   10/26/2024 Noon       Potential issues to be addressed PRIOR TO DISCHARGE  N/A    Mariam Castro  EXT [id number]                 .

## 2024-10-27 NOTE — ED TRIAGE NOTES
Patient presents to ED via EMS from home after a fall where she tripped over her great grandchild going down a step and fell into a china cabinet.  Patient states she hit her head on the cabinet, denies LOC, and c/o pain/injury to left arm.  Patient's left arm in a temporary cardboard splint upon arrival to ED.  Patient AA&Ox4 with GCS of 15 at this time.  Patient takes Plavix and ASA.

## 2024-10-28 ENCOUNTER — ANESTHESIA (OUTPATIENT)
Dept: SURGERY | Facility: HOSPITAL | Age: 89
End: 2024-10-28
Payer: MEDICARE

## 2024-10-28 ENCOUNTER — ANESTHESIA EVENT (OUTPATIENT)
Dept: SURGERY | Facility: HOSPITAL | Age: 89
End: 2024-10-28
Payer: MEDICARE

## 2024-10-28 LAB
ALBUMIN SERPL BCP-MCNC: 2.8 G/DL (ref 3.5–5)
ALBUMIN/GLOB SERPL: 0.9 {RATIO}
ALP SERPL-CCNC: 70 U/L (ref 55–142)
ALT SERPL W P-5'-P-CCNC: 17 U/L (ref 13–56)
ANION GAP SERPL CALCULATED.3IONS-SCNC: 7 MMOL/L (ref 7–16)
AST SERPL W P-5'-P-CCNC: 22 U/L (ref 15–37)
BASOPHILS # BLD AUTO: 0.01 K/UL (ref 0–0.2)
BASOPHILS NFR BLD AUTO: 0.2 % (ref 0–1)
BILIRUB SERPL-MCNC: 0.3 MG/DL (ref ?–1.2)
BUN SERPL-MCNC: 16 MG/DL (ref 7–18)
BUN/CREAT SERPL: 16 (ref 6–20)
CALCIUM SERPL-MCNC: 8.5 MG/DL (ref 8.5–10.1)
CHLORIDE SERPL-SCNC: 105 MMOL/L (ref 98–107)
CO2 SERPL-SCNC: 30 MMOL/L (ref 21–32)
CREAT SERPL-MCNC: 1.02 MG/DL (ref 0.55–1.02)
DIFFERENTIAL METHOD BLD: ABNORMAL
EGFR (NO RACE VARIABLE) (RUSH/TITUS): 51 ML/MIN/1.73M2
EOSINOPHIL # BLD AUTO: 0.03 K/UL (ref 0–0.5)
EOSINOPHIL NFR BLD AUTO: 0.6 % (ref 1–4)
ERYTHROCYTE [DISTWIDTH] IN BLOOD BY AUTOMATED COUNT: 12.6 % (ref 11.5–14.5)
EST. AVERAGE GLUCOSE BLD GHB EST-MCNC: 174 MG/DL
GLOBULIN SER-MCNC: 3.1 G/DL (ref 2–4)
GLUCOSE SERPL-MCNC: 220 MG/DL (ref 70–105)
GLUCOSE SERPL-MCNC: 239 MG/DL (ref 70–105)
GLUCOSE SERPL-MCNC: 241 MG/DL (ref 70–105)
GLUCOSE SERPL-MCNC: 243 MG/DL (ref 70–105)
GLUCOSE SERPL-MCNC: 273 MG/DL (ref 74–106)
HBA1C MFR BLD HPLC: 7.7 % (ref 4.5–6.6)
HCT VFR BLD AUTO: 33.3 % (ref 38–47)
HGB BLD-MCNC: 10.9 G/DL (ref 12–16)
IMM GRANULOCYTES # BLD AUTO: 0.02 K/UL (ref 0–0.04)
IMM GRANULOCYTES NFR BLD: 0.4 % (ref 0–0.4)
LYMPHOCYTES # BLD AUTO: 1.36 K/UL (ref 1–4.8)
LYMPHOCYTES NFR BLD AUTO: 26.3 % (ref 27–41)
MCH RBC QN AUTO: 29.5 PG (ref 27–31)
MCHC RBC AUTO-ENTMCNC: 32.7 G/DL (ref 32–36)
MCV RBC AUTO: 90 FL (ref 80–96)
MONOCYTES # BLD AUTO: 0.45 K/UL (ref 0–0.8)
MONOCYTES NFR BLD AUTO: 8.7 % (ref 2–6)
MPC BLD CALC-MCNC: 10.2 FL (ref 9.4–12.4)
NEUTROPHILS # BLD AUTO: 3.31 K/UL (ref 1.8–7.7)
NEUTROPHILS NFR BLD AUTO: 63.8 % (ref 53–65)
NRBC # BLD AUTO: 0 X10E3/UL
NRBC, AUTO (.00): 0 %
NT-PROBNP SERPL-MCNC: 1848 PG/ML (ref 1–450)
PLATELET # BLD AUTO: 137 K/UL (ref 150–400)
POTASSIUM SERPL-SCNC: 4.5 MMOL/L (ref 3.5–5.1)
PROT SERPL-MCNC: 5.9 G/DL (ref 6.4–8.2)
RBC # BLD AUTO: 3.7 M/UL (ref 4.2–5.4)
SODIUM SERPL-SCNC: 137 MMOL/L (ref 136–145)
WBC # BLD AUTO: 5.18 K/UL (ref 4.5–11)

## 2024-10-28 PROCEDURE — 94761 N-INVAS EAR/PLS OXIMETRY MLT: CPT

## 2024-10-28 PROCEDURE — D9220A PRA ANESTHESIA: Mod: CRNA,,, | Performed by: NURSE ANESTHETIST, CERTIFIED REGISTERED

## 2024-10-28 PROCEDURE — 63600175 PHARM REV CODE 636 W HCPCS: Performed by: NURSE ANESTHETIST, CERTIFIED REGISTERED

## 2024-10-28 PROCEDURE — 80053 COMPREHEN METABOLIC PANEL: CPT

## 2024-10-28 PROCEDURE — 63600175 PHARM REV CODE 636 W HCPCS

## 2024-10-28 PROCEDURE — 36000710: Performed by: ORTHOPAEDIC SURGERY

## 2024-10-28 PROCEDURE — 93005 ELECTROCARDIOGRAM TRACING: CPT

## 2024-10-28 PROCEDURE — 25000003 PHARM REV CODE 250

## 2024-10-28 PROCEDURE — C1713 ANCHOR/SCREW BN/BN,TIS/BN: HCPCS | Performed by: ORTHOPAEDIC SURGERY

## 2024-10-28 PROCEDURE — 71000033 HC RECOVERY, INTIAL HOUR: Performed by: ORTHOPAEDIC SURGERY

## 2024-10-28 PROCEDURE — 63600175 PHARM REV CODE 636 W HCPCS: Performed by: ORTHOPAEDIC SURGERY

## 2024-10-28 PROCEDURE — 63600175 PHARM REV CODE 636 W HCPCS: Performed by: FAMILY MEDICINE

## 2024-10-28 PROCEDURE — 99900035 HC TECH TIME PER 15 MIN (STAT)

## 2024-10-28 PROCEDURE — 0PSJ04Z REPOSITION LEFT RADIUS WITH INTERNAL FIXATION DEVICE, OPEN APPROACH: ICD-10-PCS | Performed by: ORTHOPAEDIC SURGERY

## 2024-10-28 PROCEDURE — 36415 COLL VENOUS BLD VENIPUNCTURE: CPT

## 2024-10-28 PROCEDURE — 63600175 PHARM REV CODE 636 W HCPCS: Performed by: ANESTHESIOLOGY

## 2024-10-28 PROCEDURE — C1769 GUIDE WIRE: HCPCS | Performed by: ORTHOPAEDIC SURGERY

## 2024-10-28 PROCEDURE — 37000009 HC ANESTHESIA EA ADD 15 MINS: Performed by: ORTHOPAEDIC SURGERY

## 2024-10-28 PROCEDURE — 27000655: Performed by: NURSE ANESTHETIST, CERTIFIED REGISTERED

## 2024-10-28 PROCEDURE — 27000716 HC OXISENSOR PROBE, ANY SIZE: Performed by: NURSE ANESTHETIST, CERTIFIED REGISTERED

## 2024-10-28 PROCEDURE — 25000003 PHARM REV CODE 250: Performed by: NURSE ANESTHETIST, CERTIFIED REGISTERED

## 2024-10-28 PROCEDURE — 27000510 HC BLANKET BAIR HUGGER ANY SIZE: Performed by: NURSE ANESTHETIST, CERTIFIED REGISTERED

## 2024-10-28 PROCEDURE — D9220A PRA ANESTHESIA: Mod: ANES,,, | Performed by: ANESTHESIOLOGY

## 2024-10-28 PROCEDURE — 83036 HEMOGLOBIN GLYCOSYLATED A1C: CPT

## 2024-10-28 PROCEDURE — 36000711: Performed by: ORTHOPAEDIC SURGERY

## 2024-10-28 PROCEDURE — 25000003 PHARM REV CODE 250: Performed by: ORTHOPAEDIC SURGERY

## 2024-10-28 PROCEDURE — 37000008 HC ANESTHESIA 1ST 15 MINUTES: Performed by: ORTHOPAEDIC SURGERY

## 2024-10-28 PROCEDURE — 85025 COMPLETE CBC W/AUTO DIFF WBC: CPT

## 2024-10-28 PROCEDURE — 27000284 HC CANNULA NASAL: Performed by: NURSE ANESTHETIST, CERTIFIED REGISTERED

## 2024-10-28 PROCEDURE — 82962 GLUCOSE BLOOD TEST: CPT

## 2024-10-28 PROCEDURE — 83880 ASSAY OF NATRIURETIC PEPTIDE: CPT

## 2024-10-28 PROCEDURE — 11000001 HC ACUTE MED/SURG PRIVATE ROOM

## 2024-10-28 PROCEDURE — 27201423 OPTIME MED/SURG SUP & DEVICES STERILE SUPPLY: Performed by: ORTHOPAEDIC SURGERY

## 2024-10-28 PROCEDURE — 27000177 HC AIRWAY, LARYNGEAL MASK: Performed by: NURSE ANESTHETIST, CERTIFIED REGISTERED

## 2024-10-28 DEVICE — PLATE VA-LCP 6H SS STRL L 51MM: Type: IMPLANTABLE DEVICE | Site: WRIST | Status: FUNCTIONAL

## 2024-10-28 DEVICE — SCREW STRDRV REC T8 2.7X12 SS: Type: IMPLANTABLE DEVICE | Site: WRIST | Status: FUNCTIONAL

## 2024-10-28 RX ORDER — EZETIMIBE 10 MG/1
10 TABLET ORAL DAILY
COMMUNITY
Start: 2024-09-16 | End: 2025-03-15

## 2024-10-28 RX ORDER — GLYCOPYRROLATE 0.2 MG/ML
INJECTION INTRAMUSCULAR; INTRAVENOUS
Status: DISCONTINUED | OUTPATIENT
Start: 2024-10-28 | End: 2024-10-28

## 2024-10-28 RX ORDER — FENTANYL CITRATE 50 UG/ML
INJECTION, SOLUTION INTRAMUSCULAR; INTRAVENOUS
Status: DISCONTINUED | OUTPATIENT
Start: 2024-10-28 | End: 2024-10-28

## 2024-10-28 RX ORDER — DIPHENHYDRAMINE HYDROCHLORIDE 50 MG/ML
25 INJECTION INTRAMUSCULAR; INTRAVENOUS EVERY 6 HOURS PRN
Status: DISCONTINUED | OUTPATIENT
Start: 2024-10-28 | End: 2024-11-01 | Stop reason: HOSPADM

## 2024-10-28 RX ORDER — MEPERIDINE HYDROCHLORIDE 25 MG/ML
25 INJECTION INTRAMUSCULAR; INTRAVENOUS; SUBCUTANEOUS EVERY 10 MIN PRN
Status: ACTIVE | OUTPATIENT
Start: 2024-10-28 | End: 2024-10-28

## 2024-10-28 RX ORDER — MORPHINE SULFATE 2 MG/ML
2 INJECTION, SOLUTION INTRAMUSCULAR; INTRAVENOUS EVERY 4 HOURS PRN
Status: DISCONTINUED | OUTPATIENT
Start: 2024-10-28 | End: 2024-11-01 | Stop reason: HOSPADM

## 2024-10-28 RX ORDER — LIDOCAINE HYDROCHLORIDE 20 MG/ML
INJECTION, SOLUTION EPIDURAL; INFILTRATION; INTRACAUDAL; PERINEURAL
Status: DISCONTINUED | OUTPATIENT
Start: 2024-10-28 | End: 2024-10-28

## 2024-10-28 RX ORDER — DIPHENHYDRAMINE HYDROCHLORIDE 50 MG/ML
INJECTION INTRAMUSCULAR; INTRAVENOUS
Status: DISCONTINUED | OUTPATIENT
Start: 2024-10-28 | End: 2024-10-28

## 2024-10-28 RX ORDER — PROPOFOL 10 MG/ML
INJECTION, EMULSION INTRAVENOUS
Status: DISCONTINUED | OUTPATIENT
Start: 2024-10-28 | End: 2024-10-28

## 2024-10-28 RX ORDER — ONDANSETRON HYDROCHLORIDE 2 MG/ML
INJECTION, SOLUTION INTRAVENOUS
Status: DISCONTINUED | OUTPATIENT
Start: 2024-10-28 | End: 2024-10-28

## 2024-10-28 RX ORDER — HYDROCODONE BITARTRATE AND ACETAMINOPHEN 5; 325 MG/1; MG/1
1 TABLET ORAL EVERY 4 HOURS PRN
Status: DISCONTINUED | OUTPATIENT
Start: 2024-10-28 | End: 2024-10-28

## 2024-10-28 RX ORDER — IPRATROPIUM BROMIDE AND ALBUTEROL SULFATE 2.5; .5 MG/3ML; MG/3ML
3 SOLUTION RESPIRATORY (INHALATION) ONCE
Status: DISCONTINUED | OUTPATIENT
Start: 2024-10-28 | End: 2024-11-01 | Stop reason: HOSPADM

## 2024-10-28 RX ORDER — MORPHINE SULFATE 2 MG/ML
2 INJECTION, SOLUTION INTRAMUSCULAR; INTRAVENOUS
Status: COMPLETED | OUTPATIENT
Start: 2024-10-28 | End: 2024-10-28

## 2024-10-28 RX ORDER — ONDANSETRON HYDROCHLORIDE 2 MG/ML
4 INJECTION, SOLUTION INTRAVENOUS DAILY PRN
Status: DISCONTINUED | OUTPATIENT
Start: 2024-10-28 | End: 2024-11-01 | Stop reason: HOSPADM

## 2024-10-28 RX ORDER — HYDROCODONE BITARTRATE AND ACETAMINOPHEN 5; 325 MG/1; MG/1
2 TABLET ORAL EVERY 4 HOURS PRN
Status: DISCONTINUED | OUTPATIENT
Start: 2024-10-28 | End: 2024-10-29

## 2024-10-28 RX ORDER — HYDROMORPHONE HYDROCHLORIDE 2 MG/ML
0.5 INJECTION, SOLUTION INTRAMUSCULAR; INTRAVENOUS; SUBCUTANEOUS EVERY 5 MIN PRN
Status: DISCONTINUED | OUTPATIENT
Start: 2024-10-28 | End: 2024-10-28

## 2024-10-28 RX ORDER — CEFAZOLIN SODIUM 1 G/3ML
1 INJECTION, POWDER, FOR SOLUTION INTRAMUSCULAR; INTRAVENOUS
Status: COMPLETED | OUTPATIENT
Start: 2024-10-28 | End: 2024-10-28

## 2024-10-28 RX ORDER — MORPHINE SULFATE 10 MG/ML
4 INJECTION INTRAMUSCULAR; INTRAVENOUS; SUBCUTANEOUS EVERY 5 MIN PRN
Status: DISCONTINUED | OUTPATIENT
Start: 2024-10-28 | End: 2024-10-28

## 2024-10-28 RX ORDER — MORPHINE SULFATE 2 MG/ML
2 INJECTION, SOLUTION INTRAMUSCULAR; INTRAVENOUS ONCE
Status: COMPLETED | OUTPATIENT
Start: 2024-10-28 | End: 2024-10-28

## 2024-10-28 RX ORDER — PHENYLEPHRINE HYDROCHLORIDE 10 MG/ML
INJECTION INTRAVENOUS
Status: DISCONTINUED | OUTPATIENT
Start: 2024-10-28 | End: 2024-10-28

## 2024-10-28 RX ADMIN — HYDROCODONE BITARTRATE AND ACETAMINOPHEN 1 TABLET: 5; 325 TABLET ORAL at 06:10

## 2024-10-28 RX ADMIN — HYDROCODONE BITARTRATE AND ACETAMINOPHEN 1 TABLET: 5; 325 TABLET ORAL at 11:10

## 2024-10-28 RX ADMIN — MORPHINE SULFATE 4 MG: 10 INJECTION, SOLUTION INTRAMUSCULAR; INTRAVENOUS at 01:10

## 2024-10-28 RX ADMIN — PHENYLEPHRINE HYDROCHLORIDE 100 MCG: 10 INJECTION INTRAVENOUS at 12:10

## 2024-10-28 RX ADMIN — HYDROCODONE BITARTRATE AND ACETAMINOPHEN 1 TABLET: 5; 325 TABLET ORAL at 12:10

## 2024-10-28 RX ADMIN — CEFAZOLIN 1 G: 330 INJECTION, POWDER, FOR SOLUTION INTRAMUSCULAR; INTRAVENOUS at 02:10

## 2024-10-28 RX ADMIN — PROPOFOL 70 MG: 10 INJECTION, EMULSION INTRAVENOUS at 11:10

## 2024-10-28 RX ADMIN — ATORVASTATIN CALCIUM 40 MG: 40 TABLET, FILM COATED ORAL at 09:10

## 2024-10-28 RX ADMIN — MORPHINE SULFATE 2 MG: 2 INJECTION, SOLUTION INTRAMUSCULAR; INTRAVENOUS at 02:10

## 2024-10-28 RX ADMIN — ONDANSETRON 4 MG: 2 INJECTION INTRAMUSCULAR; INTRAVENOUS at 12:10

## 2024-10-28 RX ADMIN — GLYCOPYRROLATE 0.2 MG: 0.2 INJECTION INTRAMUSCULAR; INTRAVENOUS at 12:10

## 2024-10-28 RX ADMIN — SODIUM CHLORIDE: 9 INJECTION, SOLUTION INTRAVENOUS at 11:10

## 2024-10-28 RX ADMIN — SODIUM CHLORIDE 1 G: 9 INJECTION, SOLUTION INTRAVENOUS at 12:10

## 2024-10-28 RX ADMIN — MORPHINE SULFATE 2 MG: 10 INJECTION, SOLUTION INTRAMUSCULAR; INTRAVENOUS at 01:10

## 2024-10-28 RX ADMIN — FENTANYL CITRATE 25 MCG: 50 INJECTION, SOLUTION INTRAMUSCULAR; INTRAVENOUS at 12:10

## 2024-10-28 RX ADMIN — INSULIN ASPART 1 UNITS: 100 INJECTION, SOLUTION INTRAVENOUS; SUBCUTANEOUS at 10:10

## 2024-10-28 RX ADMIN — HYDRALAZINE HYDROCHLORIDE 25 MG: 25 TABLET ORAL at 09:10

## 2024-10-28 RX ADMIN — CARVEDILOL 12.5 MG: 12.5 TABLET, FILM COATED ORAL at 09:10

## 2024-10-28 RX ADMIN — HYDRALAZINE HYDROCHLORIDE 25 MG: 25 TABLET ORAL at 02:10

## 2024-10-28 RX ADMIN — CEFAZOLIN 1 G: 330 INJECTION, POWDER, FOR SOLUTION INTRAMUSCULAR; INTRAVENOUS at 06:10

## 2024-10-28 RX ADMIN — MORPHINE SULFATE 2 MG: 2 INJECTION, SOLUTION INTRAMUSCULAR; INTRAVENOUS at 11:10

## 2024-10-28 RX ADMIN — MORPHINE SULFATE 2 MG: 2 INJECTION, SOLUTION INTRAMUSCULAR; INTRAVENOUS at 04:10

## 2024-10-28 RX ADMIN — LIDOCAINE HYDROCHLORIDE 60 MG: 20 INJECTION, SOLUTION INTRAVENOUS at 11:10

## 2024-10-28 RX ADMIN — DIPHENHYDRAMINE HYDROCHLORIDE 6.25 MG: 50 INJECTION, SOLUTION INTRAMUSCULAR; INTRAVENOUS at 12:10

## 2024-10-28 NOTE — ASSESSMENT & PLAN NOTE
Xray shows distal radius fracture, Orthopedics consulted, will hold patient asa and plavix, last dose of plavix was on Saturday evening.   RCRI score 3 15% 30 day risk of death MI or cardiac arrest, will get BNP in morning, if greater than 300 will need EKG after surgery and troponins trended daily for 24-48 hours after surgery. EKG unremarkable. Cleared for surgery.

## 2024-10-28 NOTE — ASSESSMENT & PLAN NOTE
Patients blood pressure range in the last 24 hours was: BP  Min: 102/42  Max: 195/80.The patient's inpatient anti-hypertensive regimen is listed below:    Current Antihypertensives  hydrALAZINE tablet 25 mg, 3 times daily, Oral  carvediloL tablet 12.5 mg, 2 times daily, Oral  furosemide tablet 20 mg, Daily, Oral    Plan  - BP is controlled, no changes needed to their regimen

## 2024-10-28 NOTE — PROGRESS NOTES
No issues postop.  Continue with orders.  Continue Ancef for her open fracture through tomorrow.  Anticipate home then.

## 2024-10-28 NOTE — ASSESSMENT & PLAN NOTE
Patient with known CAD s/p CABG, which is controlled Will continue ASA, Plavix, and Statin and monitor for S/Sx of angina/ACS. Continue to monitor on telemetry.   Will hold plavix, asa for surgery at this time.

## 2024-10-28 NOTE — SUBJECTIVE & OBJECTIVE
Interval History:     No significant events overnight, no new complaints or concerns. Complaining of pain so will adjust pain management while she awaits surgery later today.     Review of Systems   Constitutional:  Positive for fatigue. Negative for fever.   Respiratory:  Negative for cough and shortness of breath.    Cardiovascular:  Negative for chest pain.   Gastrointestinal:  Negative for abdominal pain, constipation, diarrhea, nausea and vomiting.   Musculoskeletal:  Positive for arthralgias and joint swelling.   Neurological:  Negative for weakness and headaches.     Objective:     Vital Signs (Most Recent):  Temp: 98.9 °F (37.2 °C) (10/27/24 1700)  Pulse: (!) 55 (10/28/24 0837)  Resp: 16 (10/28/24 0608)  BP: (!) 137/47 (10/28/24 0837)  SpO2: (!) 93 % (10/28/24 0837) Vital Signs (24h Range):  Temp:  [97.6 °F (36.4 °C)-98.9 °F (37.2 °C)] 98.9 °F (37.2 °C)  Pulse:  [52-63] 55  Resp:  [0-26] 16  SpO2:  [88 %-100 %] 93 %  BP: (102-195)/(38-98) 137/47     Weight: 65.8 kg (145 lb)  Body mass index is 27.4 kg/m².    Intake/Output Summary (Last 24 hours) at 10/28/2024 1110  Last data filed at 10/27/2024 1546  Gross per 24 hour   Intake 500 ml   Output --   Net 500 ml         Physical Exam  Vitals and nursing note reviewed.   Eyes:      General: No scleral icterus.     Conjunctiva/sclera: Conjunctivae normal.   Cardiovascular:      Rate and Rhythm: Normal rate and regular rhythm.   Pulmonary:      Effort: Pulmonary effort is normal. No respiratory distress.   Abdominal:      General: Abdomen is flat.      Palpations: Abdomen is soft.      Tenderness: There is no abdominal tenderness.   Musculoskeletal:      Right forearm: Normal.      Left forearm: Swelling, deformity and tenderness present.      Right lower leg: Edema present.      Left lower leg: Edema present.   Skin:     General: Skin is warm.   Neurological:      Mental Status: She is alert. Mental status is at baseline.             Significant Labs: All  pertinent labs within the past 24 hours have been reviewed.    Significant Imaging: I have reviewed all pertinent imaging results/findings within the past 24 hours.

## 2024-10-28 NOTE — ASSESSMENT & PLAN NOTE
"Patient's FSGs are uncontrolled due to hyperglycemia on current medication regimen. Will plan to resume patient's home 70/30 following surgery. Will need close perioperative monitoring, continue POCT glucose QID.    Last A1c reviewed-   Lab Results   Component Value Date    HGBA1C 7.7 (H) 10/28/2024     Most recent fingerstick glucose reviewed- No results for input(s): "POCTGLUCOSE" in the last 24 hours.  Current correctional scale  Low  Maintain anti-hyperglycemic dose as follows-   Antihyperglycemics (From admission, onward)      Start     Stop Route Frequency Ordered    10/27/24 2039  insulin aspart U-100 injection 0-5 Units         -- SubQ Before meals & nightly PRN 10/27/24 1943          Hold Oral hypoglycemics while patient is in the hospital.      "

## 2024-10-28 NOTE — CONSULTS
CC:  Left grade 1 open distal radius, ulna fractures    HPI: Ms Mcgraw sustained a left grade 1 open distal radius and ulnar fractures after tripping over her great grandchild and falling on her outstretched wrist.  She denies any other injury.  She has received Ancef and tetanus in the emergency room.  No prior history of injuries to the left ankle.    PMH: Diabetes, hypertension, CAD  Past surgical history: CABG  Meds:  Aspirin, Dulcolax, Coreg, Plavix, Lasix, Apresoline, NovoLog, Crestor, potassium  NKDA  Review of systems reviewed.    Alert oriented  Spine nontender  Other extremities nontender  Left upper extremity, splinted.  +F/E fingers  Sensation is intact to light touch  Small volar laceration      Radiographs left wrist demonstrate an intra-articular displaced comminuted distal radius and comminuted distal ulnar fracture.  Left ankle x-rays demonstrate an inferomedial map malleolar ossicle.    Hemoglobin 10.9 this morning    Cervical spine diffuse spondylosis  Head CT negative for any acute injury.    Impression:  Right grade 1 intra-articular distal radius, distal ulnar fractures    Plan:  I have recommended irrigation, debridement, open reduction fixation of the distal radius and anticipated close treatment of the distal ulnar fracture.  Continue Ancef.  I have reviewed the risks, benefits and rationale for procedure with the patient.

## 2024-10-28 NOTE — SUBJECTIVE & OBJECTIVE
Past Medical History:   Diagnosis Date    Diabetes mellitus     Hypertension        History reviewed. No pertinent surgical history.    Review of patient's allergies indicates:  No Known Allergies    No current facility-administered medications on file prior to encounter.     Current Outpatient Medications on File Prior to Encounter   Medication Sig    aspirin (ECOTRIN) 81 MG EC tablet Take 81 mg by mouth once daily.    bisacodyL (DULCOLAX) 5 mg EC tablet Take 5 mg by mouth daily as needed for Constipation.    carvediloL (COREG) 12.5 MG tablet Take 12.5 mg by mouth 2 (two) times daily.    clopidogreL (PLAVIX) 75 mg tablet Take 75 mg by mouth once daily.    furosemide (LASIX) 20 MG tablet Take 20 mg by mouth once daily.    hydrALAZINE (APRESOLINE) 25 MG tablet Take 25 mg by mouth 3 (three) times daily.    NOVOLOG MIX 70-30FLEXPEN U-100 100 unit/mL (70-30) InPn pen Inject into the skin.   Inject ten Units under the skin in the morning and ten Units in the evening. Inject with meals.    rosuvastatin (CRESTOR) 40 MG Tab Take 20 mg by mouth every evening.    potassium chloride (MICRO-K) 10 MEQ CpSR Take 10 mEq by mouth once daily.     Family History    None       Tobacco Use    Smoking status: Never    Smokeless tobacco: Never   Substance and Sexual Activity    Alcohol use: Never    Drug use: Never    Sexual activity: Never     Review of Systems   Constitutional:  Positive for fatigue. Negative for fever.   Respiratory:  Negative for cough and shortness of breath.    Cardiovascular:  Negative for chest pain.   Gastrointestinal:  Negative for abdominal pain, constipation, diarrhea, nausea and vomiting.   Musculoskeletal:  Positive for arthralgias and joint swelling.   Neurological:  Negative for weakness and headaches.     Objective:     Vital Signs (Most Recent):  Temp: 98.9 °F (37.2 °C) (10/27/24 1700)  Pulse: 63 (10/27/24 1919)  Resp: 16 (10/27/24 1921)  BP: (!) 151/45 (10/27/24 1919)  SpO2: 95 % (10/27/24 1916) Vital  Signs (24h Range):  Temp:  [97.6 °F (36.4 °C)-98.9 °F (37.2 °C)] 98.9 °F (37.2 °C)  Pulse:  [55-63] 63  Resp:  [0-26] 16  SpO2:  [88 %-100 %] 95 %  BP: (151-195)/(45-80) 151/45     Weight: 65.8 kg (145 lb)  Body mass index is 27.4 kg/m².     Physical Exam  Vitals and nursing note reviewed.   Constitutional:       Appearance: Normal appearance.   HENT:      Head: Normocephalic.      Right Ear: External ear normal.      Left Ear: External ear normal.      Nose: Nose normal.      Mouth/Throat:      Pharynx: Oropharynx is clear.   Eyes:      Conjunctiva/sclera: Conjunctivae normal.   Cardiovascular:      Rate and Rhythm: Normal rate and regular rhythm.      Pulses: Normal pulses.      Heart sounds: Normal heart sounds.   Pulmonary:      Effort: Pulmonary effort is normal.      Breath sounds: Normal breath sounds.   Abdominal:      General: Abdomen is flat.      Palpations: Abdomen is soft.   Musculoskeletal:      Right forearm: Normal.      Left forearm: Swelling, deformity and tenderness present.      Right lower leg: Edema present.      Left lower leg: Edema present.   Skin:     General: Skin is warm.   Neurological:      Mental Status: She is alert and oriented to person, place, and time.   Psychiatric:         Mood and Affect: Mood normal.         Behavior: Behavior normal.         Thought Content: Thought content normal.         Judgment: Judgment normal.                Significant Labs: All pertinent labs within the past 24 hours have been reviewed.    Significant Imaging: I have reviewed all pertinent imaging results/findings within the past 24 hours.

## 2024-10-28 NOTE — PLAN OF CARE
Problem: Adult Inpatient Plan of Care  Goal: Plan of Care Review  Outcome: Progressing  Goal: Patient-Specific Goal (Individualized)  Outcome: Progressing  Goal: Absence of Hospital-Acquired Illness or Injury  Outcome: Progressing  Goal: Optimal Comfort and Wellbeing  Outcome: Progressing  Goal: Readiness for Transition of Care  Outcome: Progressing     Problem: Wound  Goal: Optimal Coping  Outcome: Progressing  Goal: Optimal Functional Ability  Outcome: Progressing  Goal: Absence of Infection Signs and Symptoms  Outcome: Progressing  Goal: Improved Oral Intake  Outcome: Progressing  Goal: Optimal Pain Control and Function  Outcome: Progressing  Goal: Skin Health and Integrity  Outcome: Progressing  Goal: Optimal Wound Healing  Outcome: Progressing     Problem: Diabetes Comorbidity  Goal: Blood Glucose Level Within Targeted Range  Outcome: Progressing     Problem: Fall Injury Risk  Goal: Absence of Fall and Fall-Related Injury  Outcome: Progressing

## 2024-10-28 NOTE — ASSESSMENT & PLAN NOTE
To OR for surgical intervention later today.   ---  Xray shows distal radius fracture, Orthopedics consulted, will hold patient asa and plavix, last dose of plavix was on Saturday evening.     RCRI score 3 15% 30 day risk of death MI or cardiac arrest, will get BNP in morning, if greater than 300 will need EKG after surgery and troponins trended daily for 24-48 hours after surgery. EKG unremarkable. No obvious contraindications to surgery or anesthesia.

## 2024-10-28 NOTE — HPI
Patient is a 89 yo F who presents to [facility] after a fall. Patient reports she tripped over her great grandchild and fell on her outstretched left hand. Patient has a PMHx of HTN, CHF, CAD with CABG.      Initial presenting vitals in the ED HR 57, /80, Temp 97.6, Spo2 88% on RA. Initial presenting labs WBC 6.15, H/H 11.7/36.7, Platelet 139, PT 4.1, INR 1.10, Ptt 31.3, Na 136, K 4.6, Cl 102, Co2 32, Glu 300, Ca 8.8, BUN/Cr 17/1.08 CT head and neck negative for fractures.CXR cardiomegaly Xray wrist  complex fracture of distal radius and ulna. Orthopedic surgery consulted, start on ancef.      This patient will be admitted to [facility] for continued medical management.

## 2024-10-28 NOTE — PROGRESS NOTES
[facility]  Hospital Medicine  Progress Note    Patient Name: Jayme Mcgraw  MRN: [id number]  Patient Class: IP- Inpatient   Admission Date: 10/27/2024  Length of Stay: 1 days  Attending Physician: Cara Ramirez DO  Primary Care Provider: Erin, Primary Doctor        Subjective:     Principal Problem:Fracture of left radius        HPI:  Patient is a 89 yo F who presents to [facility] after a fall. Patient reports she tripped over her great grandchild and fell on her outstretched left hand. Patient has a PMHx of HTN, CHF, CAD with CABG.      Initial presenting vitals in the ED HR 57, /80, Temp 97.6, Spo2 88% on RA. Initial presenting labs WBC 6.15, H/H 11.7/36.7, Platelet 139, PT 4.1, INR 1.10, Ptt 31.3, Na 136, K 4.6, Cl 102, Co2 32, Glu 300, Ca 8.8, BUN/Cr 17/1.08 CT head and neck negative for fractures.CXR cardiomegaly Xray wrist  complex fracture of distal radius and ulna. Orthopedic surgery consulted, start on ancef.      This patient will be admitted to [facility] for continued medical management.      Overview/Hospital Course:  No notes on file    Interval History:     No significant events overnight, no new complaints or concerns. Complaining of pain so will adjust pain management while she awaits surgery later today.     Review of Systems   Constitutional:  Positive for fatigue. Negative for fever.   Respiratory:  Negative for cough and shortness of breath.    Cardiovascular:  Negative for chest pain.   Gastrointestinal:  Negative for abdominal pain, constipation, diarrhea, nausea and vomiting.   Musculoskeletal:  Positive for arthralgias and joint swelling.   Neurological:  Negative for weakness and headaches.     Objective:     Vital Signs (Most Recent):  Temp: 98.9 °F (37.2 °C) (10/27/24 1700)  Pulse: (!) 55 (10/28/24 0837)  Resp: 16 (10/28/24 0608)  BP: (!) 137/47 (10/28/24 0837)  SpO2: (!) 93 % (10/28/24 0837) Vital Signs (24h  Range):  Temp:  [97.6 °F (36.4 °C)-98.9 °F (37.2 °C)] 98.9 °F (37.2 °C)  Pulse:  [52-63] 55  Resp:  [0-26] 16  SpO2:  [88 %-100 %] 93 %  BP: (102-195)/(38-98) 137/47     Weight: 65.8 kg (145 lb)  Body mass index is 27.4 kg/m².    Intake/Output Summary (Last 24 hours) at 10/28/2024 1110  Last data filed at 10/27/2024 1546  Gross per 24 hour   Intake 500 ml   Output --   Net 500 ml         Physical Exam  Vitals and nursing note reviewed.   Eyes:      General: No scleral icterus.     Conjunctiva/sclera: Conjunctivae normal.   Cardiovascular:      Rate and Rhythm: Normal rate and regular rhythm.   Pulmonary:      Effort: Pulmonary effort is normal. No respiratory distress.   Abdominal:      General: Abdomen is flat.      Palpations: Abdomen is soft.      Tenderness: There is no abdominal tenderness.   Musculoskeletal:      Right forearm: Normal.      Left forearm: Swelling, deformity and tenderness present.      Right lower leg: Edema present.      Left lower leg: Edema present.   Skin:     General: Skin is warm.   Neurological:      Mental Status: She is alert. Mental status is at baseline.             Significant Labs: All pertinent labs within the past 24 hours have been reviewed.    Significant Imaging: I have reviewed all pertinent imaging results/findings within the past 24 hours.    Assessment/Plan:      * Fracture of left radius  To OR for surgical intervention later today.   ---  Xray shows distal radius fracture, Orthopedics consulted, will hold patient asa and plavix, last dose of plavix was on Saturday evening.     RCRI score 3 15% 30 day risk of death MI or cardiac arrest, will get BNP in morning, if greater than 300 will need EKG after surgery and troponins trended daily for 24-48 hours after surgery. EKG unremarkable. No obvious contraindications to surgery or anesthesia.       Fracture of left ulna  See plan above, xray shows distal ulna fracture.       HTN (hypertension)  Patients blood pressure range  "in the last 24 hours was: BP  Min: 102/42  Max: 195/80.The patient's inpatient anti-hypertensive regimen is listed below:    Current Antihypertensives  hydrALAZINE tablet 25 mg, 3 times daily, Oral  carvediloL tablet 12.5 mg, 2 times daily, Oral  furosemide tablet 20 mg, Daily, Oral    Plan  - BP is controlled, no changes needed to their regimen      Coronary artery disease involving coronary bypass graft  Patient with known CAD s/p CABG, which is controlled Will continue ASA, Plavix, and Statin and monitor for S/Sx of angina/ACS. Continue to monitor on telemetry.   Will hold plavix, asa for surgery at this time.     Type 2 diabetes mellitus, without long-term current use of insulin  Patient's FSGs are uncontrolled due to hyperglycemia on current medication regimen. Will plan to resume patient's home 70/30 following surgery. Will need close perioperative monitoring, continue POCT glucose QID.    Last A1c reviewed-   Lab Results   Component Value Date    HGBA1C 7.7 (H) 10/28/2024     Most recent fingerstick glucose reviewed- No results for input(s): "POCTGLUCOSE" in the last 24 hours.  Current correctional scale  Low  Maintain anti-hyperglycemic dose as follows-   Antihyperglycemics (From admission, onward)      Start     Stop Route Frequency Ordered    10/27/24 2039  insulin aspart U-100 injection 0-5 Units         -- SubQ Before meals & nightly PRN 10/27/24 1943          Hold Oral hypoglycemics while patient is in the hospital.          VTE Risk Mitigation (From admission, onward)           Ordered     IP VTE HIGH RISK PATIENT  Once         10/27/24 1943     Place sequential compression device  Until discontinued         10/27/24 1943                    Discharge Planning   DEON:      Code Status: Full Code   Is the patient medically ready for discharge?:     Reason for patient still in hospital (select all that apply): Treatment  Discharge Plan A: Home with family, Home Health                  Cara Ramirez, " DO  Department of Hospital Medicine   [facility]

## 2024-10-28 NOTE — OP NOTE
Date:  10/28/2024    Diagnosis:  Left grade 2 open intra-articular distal radius fracture with 2 intra-articular fragments    Procedure:  ORIF left intra-articular distal radius fracture   Irrigation debridement left grade 2 open intra-articular distal radius fracture    SURGEON:  Arlen    ANESTHESIA:  General    PROCEDURE AND FINDINGS:  After adequate anesthesia was induced, the left upper extremity was prepped and draped in usual sterile fashion.  The limb was exsanguinated with Esmarch.  Tourniquet was inflated to 250 mmHg.  Estimated tourniquet time was 31 minutes.  A chevron incision was made distally over the wrist flexion creases followed by longitudinal incision proximally.  There was a small skin tag which was excised with the incision.  The FCR tendon sheath was entered superficially and exited deep.  FPL was retracted ulnarly.  Pronator quadratus was released from the radial border of the distal radius.  The fracture was identified and reduced and temporarily pinned with a K-wire.  Fracture was fixed with a Synthes locking volar radial wrist plate with four locking pegs distally and two 2.7 mm cortical screws proximally.  Image intensification was used in multiple planes throughout the procedure.  Pronator quadratus was repaired with 0 Vicryl.  Subcutaneous tissue was closed with 3-0 Vicryl.  Skin was closed with 4-0 nylon suture.  Sterile dressing and short-arm splint were applied.    EBL:  Less than 5 cc

## 2024-10-28 NOTE — PLAN OF CARE
[facility] - Emergency Department  Initial Discharge Assessment       Primary Care Provider: No, Primary Doctor    Admission Diagnosis: Radial fracture [S52.90XA]    Admission Date: 10/27/2024  Expected Discharge Date:     Transition of Care Barriers: None    Payor: MEDICARE / Plan: MEDICARE PART A & B / Product Type: Government /     Extended Emergency Contact Information  Primary Emergency Contact: Soo Rivera  Mobile Phone: [phone]  Relation: Daughter  Preferred language: English   needed? No    Discharge Plan A: Home with family, Home Health  Discharge Plan B: Home with family, Home Health      [id number] - [place], [place] - [address] AT [facility] [address]  [address]  [place] [address]  Phone: [phone] Fax: [phone]      Initial Assessment (most recent)       Adult Discharge Assessment - 10/28/24 1000          Discharge Assessment    Assessment Type Discharge Planning Assessment     Source of Information patient;family     People in Home child(delaney), adult     Facility Arrived From: Home     Do you expect to return to your current living situation? Yes     Do you have help at home or someone to help you manage your care at home? Yes     Who are your caregiver(s) and their phone number(s)? Soo Rivera - daughter - [phone]     Prior to hospitilization cognitive status: Unable to Assess     Current cognitive status: Alert/Oriented     Walking or Climbing Stairs Difficulty yes     Walking or Climbing Stairs ambulation difficulty, requires equipment;stair climbing difficulty, requires equipment;transferring difficulty, requires equipment     Dressing/Bathing Difficulty no     Home Accessibility stairs to enter home     Number of Stairs, Main Entrance three     Equipment Currently Used at Home walker, rolling     Readmission within 30 days? No     Patient currently being followed by outpatient case management? No     Do you  currently have service(s) that help you manage your care at home? No     Do you take prescription medications? Yes     Do you have prescription coverage? Yes     Coverage Medicare     Do you have any problems affording any of your prescribed medications? No     Is the patient taking medications as prescribed? yes     Who is going to help you get home at discharge? Soo Rivera - daughter     How do you get to doctors appointments? family or friend will provide     Are you on dialysis? No     Do you take coumadin? No     Discharge Plan A Home with family;Home Health     Discharge Plan B Home with family;Home Health     DME Needed Upon Discharge  none     Discharge Plan discussed with: Patient;Adult children     Transition of Care Barriers None        Physical Activity    On average, how many days per week do you engage in moderate to strenuous exercise (like a brisk walk)? 0 days     On average, how many minutes do you engage in exercise at this level? 0 min        Financial Resource Strain    How hard is it for you to pay for the very basics like food, housing, medical care, and heating? Not very hard        Housing Stability    In the last 12 months, was there a time when you were not able to pay the mortgage or rent on time? No     At any time in the past 12 months, were you homeless or living in a shelter (including now)? No        Transportation Needs    Has the lack of transportation kept you from medical appointments, meetings, work or from getting things needed for daily living? No        Food Insecurity    Within the past 12 months, you worried that your food would run out before you got the money to buy more. Never true     Within the past 12 months, the food you bought just didn't last and you didn't have money to get more. Never true        Stress    Do you feel stress - tense, restless, nervous, or anxious, or unable to sleep at night because your mind is troubled all the time - these days? Only a  little        Social Isolation    How often do you feel lonely or isolated from those around you?  Rarely        Alcohol Use    Q1: How often do you have a drink containing alcohol? Never     Q2: How many drinks containing alcohol do you have on a typical day when you are drinking? Patient does not drink     Q3: How often do you have six or more drinks on one occasion? Never        Utilities    In the past 12 months has the electric, gas, oil, or water company threatened to shut off services in your home? No        Health Literacy    How often do you need to have someone help you when you read instructions, pamphlets, or other written material from your doctor or pharmacy? Sometimes                   CM visited patient and her daughter at the bedside.  Patient lives at home with her daughter.  Not current with HH and uses a walker at home.  Patient plans to return home with daughter upon DC and would like to have HH.  IM obtained and SDOH questions completed.  CM will continue to follow for DC needs as they arise.

## 2024-10-28 NOTE — ASSESSMENT & PLAN NOTE
Patients blood pressure range in the last 24 hours was: BP  Min: 151/45  Max: 195/80.The patient's inpatient anti-hypertensive regimen is listed below:  Current Antihypertensives  , 2 times daily, Oral  , Daily, Oral  , 3 times daily, Oral  hydrALAZINE tablet 25 mg, 3 times daily, Oral  carvediloL tablet 12.5 mg, 2 times daily, Oral  furosemide tablet 20 mg, Daily, Oral    Plan  - BP is controlled, no changes needed to their regimen  -

## 2024-10-28 NOTE — OR NURSING
1303 REC'D TO PACU IN STABLE CONDITION. VSS. SATS 100% ON 2L/NC. WILL TITRATE O2 ATOL. L WRIST PAIN RATED 4/10. DSG TO L ARM C/D/I w/ ARM SLING NOTED. . NOTIFIED DR JOYNER. NO ORDERS REC'D. WILL CONT TO MONITOR.    1325 UPDATE ON PT STATUS GIVEN TO DAUGHTER VIA TEXT MESSAGE.    1410 TRANSFERRED TO ROOM 565  IN STABLE CONDITION. REPORT GIVEN TO KONRAD BENAVIDES. /72 HR 68 T 97.9 SATS 99% ON 2L/NC. FAMILY AT BEDSIDE.

## 2024-10-28 NOTE — H&P
[facility] - Emergency Department  Hospital Medicine  History & Physical    Patient Name: Jayme Mcgraw  MRN: [id number]  Patient Class: IP- Inpatient  Admission Date: 10/27/2024  Attending Physician: Erin att. providers found   Primary Care Provider: No, Primary Doctor         Patient information was obtained from patient, past medical records, and ER records.     Subjective:     Principal Problem:Fracture of left radius    Chief Complaint:   Chief Complaint   Patient presents with    Fall    Arm Injury        HPI: Patient is a 89 yo F who presents to [facility] after a fall. Patient reports she tripped over her great grandchild and fell on her outstretched left hand. Patient has a PMHx of HTN, CHF, CAD with CABG.      Initial presenting vitals in the ED HR 57, /80, Temp 97.6, Spo2 88% on RA. Initial presenting labs WBC 6.15, H/H 11.7/36.7, Platelet 139, PT 4.1, INR 1.10, Ptt 31.3, Na 136, K 4.6, Cl 102, Co2 32, Glu 300, Ca 8.8, BUN/Cr 17/1.08 CT head and neck negative for fractures.CXR cardiomegaly Xray wrist  complex fracture of distal radius and ulna. Orthopedic surgery consulted, start on ancef.      This patient will be admitted to [facility] for continued medical management.      Past Medical History:   Diagnosis Date    Diabetes mellitus     Hypertension        History reviewed. No pertinent surgical history.    Review of patient's allergies indicates:  No Known Allergies    No current facility-administered medications on file prior to encounter.     Current Outpatient Medications on File Prior to Encounter   Medication Sig    aspirin (ECOTRIN) 81 MG EC tablet Take 81 mg by mouth once daily.    bisacodyL (DULCOLAX) 5 mg EC tablet Take 5 mg by mouth daily as needed for Constipation.    carvediloL (COREG) 12.5 MG tablet Take 12.5 mg by mouth 2 (two) times daily.    clopidogreL (PLAVIX) 75 mg tablet Take 75 mg by mouth once daily.    furosemide (LASIX) 20 MG tablet  Take 20 mg by mouth once daily.    hydrALAZINE (APRESOLINE) 25 MG tablet Take 25 mg by mouth 3 (three) times daily.    NOVOLOG MIX 70-30FLEXPEN U-100 100 unit/mL (70-30) InPn pen Inject into the skin.   Inject ten Units under the skin in the morning and ten Units in the evening. Inject with meals.    rosuvastatin (CRESTOR) 40 MG Tab Take 20 mg by mouth every evening.    potassium chloride (MICRO-K) 10 MEQ CpSR Take 10 mEq by mouth once daily.     Family History    None       Tobacco Use    Smoking status: Never    Smokeless tobacco: Never   Substance and Sexual Activity    Alcohol use: Never    Drug use: Never    Sexual activity: Never     Review of Systems   Constitutional:  Positive for fatigue. Negative for fever.   Respiratory:  Negative for cough and shortness of breath.    Cardiovascular:  Negative for chest pain.   Gastrointestinal:  Negative for abdominal pain, constipation, diarrhea, nausea and vomiting.   Musculoskeletal:  Positive for arthralgias and joint swelling.   Neurological:  Negative for weakness and headaches.     Objective:     Vital Signs (Most Recent):  Temp: 98.9 °F (37.2 °C) (10/27/24 1700)  Pulse: 63 (10/27/24 1919)  Resp: 16 (10/27/24 1921)  BP: (!) 151/45 (10/27/24 1919)  SpO2: 95 % (10/27/24 1916) Vital Signs (24h Range):  Temp:  [97.6 °F (36.4 °C)-98.9 °F (37.2 °C)] 98.9 °F (37.2 °C)  Pulse:  [55-63] 63  Resp:  [0-26] 16  SpO2:  [88 %-100 %] 95 %  BP: (151-195)/(45-80) 151/45     Weight: 65.8 kg (145 lb)  Body mass index is 27.4 kg/m².     Physical Exam  Vitals and nursing note reviewed.   Constitutional:       Appearance: Normal appearance.   HENT:      Head: Normocephalic.      Right Ear: External ear normal.      Left Ear: External ear normal.      Nose: Nose normal.      Mouth/Throat:      Pharynx: Oropharynx is clear.   Eyes:      Conjunctiva/sclera: Conjunctivae normal.   Cardiovascular:      Rate and Rhythm: Normal rate and regular rhythm.      Pulses: Normal pulses.      Heart  "sounds: Normal heart sounds.   Pulmonary:      Effort: Pulmonary effort is normal.      Breath sounds: Normal breath sounds.   Abdominal:      General: Abdomen is flat.      Palpations: Abdomen is soft.   Musculoskeletal:      Right forearm: Normal.      Left forearm: Swelling, deformity and tenderness present.      Right lower leg: Edema present.      Left lower leg: Edema present.   Skin:     General: Skin is warm.   Neurological:      Mental Status: She is alert and oriented to person, place, and time.   Psychiatric:         Mood and Affect: Mood normal.         Behavior: Behavior normal.         Thought Content: Thought content normal.         Judgment: Judgment normal.                Significant Labs: All pertinent labs within the past 24 hours have been reviewed.    Significant Imaging: I have reviewed all pertinent imaging results/findings within the past 24 hours.  Assessment/Plan:     * Fracture of left radius  Xray shows distal radius fracture, Orthopedics consulted, will hold patient asa and plavix, last dose of plavix was on Saturday evening.   RCRI score 3 15% 30 day risk of death MI or cardiac arrest, will get BNP in morning, if greater than 300 will need EKG after surgery and troponins trended daily for 24-48 hours after surgery. EKG unremarkable. Cleared for surgery.       Fracture of left ulna  See plan above, xray shows distal ulna fracture.       Coronary artery disease involving coronary bypass graft  Patient with known CAD s/p CABG, which is controlled Will continue ASA, Plavix, and Statin and monitor for S/Sx of angina/ACS. Continue to monitor on telemetry.   Will hold plavix, asa for surgery at this time.     Type 2 diabetes mellitus, without long-term current use of insulin  Patient's FSGs are uncontrolled due to hyperglycemia on current medication regimen.  Last A1c reviewed- No results found for: "LABA1C", "HGBA1C"  Most recent fingerstick glucose reviewed- No results for input(s): " ""POCTGLUCOSE" in the last 24 hours.  Current correctional scale  Low  Maintain anti-hyperglycemic dose as follows-   Antihyperglycemics (From admission, onward)      Start     Stop Route Frequency Ordered    10/27/24 2039  insulin aspart U-100 injection 0-5 Units         -- SubQ Before meals & nightly PRN 10/27/24 1943        Will get A1c on morning labs.   Hold Oral hypoglycemics while patient is in the hospital.        HTN (hypertension)  Patients blood pressure range in the last 24 hours was: BP  Min: 151/45  Max: 195/80.The patient's inpatient anti-hypertensive regimen is listed below:  Current Antihypertensives  , 2 times daily, Oral  , Daily, Oral  , 3 times daily, Oral  hydrALAZINE tablet 25 mg, 3 times daily, Oral  carvediloL tablet 12.5 mg, 2 times daily, Oral  furosemide tablet 20 mg, Daily, Oral    Plan  - BP is controlled, no changes needed to their regimen  -       VTE Risk Mitigation (From admission, onward)           Ordered     IP VTE HIGH RISK PATIENT  Once         10/27/24 1943     Place sequential compression device  Until discontinued         10/27/24 1943                                    Kwame Lang DO  Department of Hospital Medicine  [facility] - Emergency Department          "

## 2024-10-28 NOTE — ASSESSMENT & PLAN NOTE
"Patient's FSGs are uncontrolled due to hyperglycemia on current medication regimen.  Last A1c reviewed- No results found for: "LABA1C", "HGBA1C"  Most recent fingerstick glucose reviewed- No results for input(s): "POCTGLUCOSE" in the last 24 hours.  Current correctional scale  Low  Maintain anti-hyperglycemic dose as follows-   Antihyperglycemics (From admission, onward)      Start     Stop Route Frequency Ordered    10/27/24 2039  insulin aspart U-100 injection 0-5 Units         -- SubQ Before meals & nightly PRN 10/27/24 1943        Will get A1c on morning labs.   Hold Oral hypoglycemics while patient is in the hospital.      "

## 2024-10-29 LAB
ALBUMIN SERPL BCP-MCNC: 2.6 G/DL (ref 3.5–5)
ALBUMIN/GLOB SERPL: 0.8 {RATIO}
ALP SERPL-CCNC: 64 U/L (ref 55–142)
ALT SERPL W P-5'-P-CCNC: 12 U/L (ref 13–56)
ANION GAP SERPL CALCULATED.3IONS-SCNC: 8 MMOL/L (ref 7–16)
AST SERPL W P-5'-P-CCNC: 21 U/L (ref 15–37)
BASOPHILS # BLD AUTO: 0.01 K/UL (ref 0–0.2)
BASOPHILS NFR BLD AUTO: 0.1 % (ref 0–1)
BILIRUB SERPL-MCNC: 0.5 MG/DL (ref ?–1.2)
BUN SERPL-MCNC: 14 MG/DL (ref 7–18)
BUN/CREAT SERPL: 12 (ref 6–20)
CALCIUM SERPL-MCNC: 8.6 MG/DL (ref 8.5–10.1)
CHLORIDE SERPL-SCNC: 106 MMOL/L (ref 98–107)
CO2 SERPL-SCNC: 29 MMOL/L (ref 21–32)
CREAT SERPL-MCNC: 1.18 MG/DL (ref 0.55–1.02)
DIFFERENTIAL METHOD BLD: ABNORMAL
EGFR (NO RACE VARIABLE) (RUSH/TITUS): 43 ML/MIN/1.73M2
EOSINOPHIL # BLD AUTO: 0.03 K/UL (ref 0–0.5)
EOSINOPHIL NFR BLD AUTO: 0.4 % (ref 1–4)
ERYTHROCYTE [DISTWIDTH] IN BLOOD BY AUTOMATED COUNT: 12.9 % (ref 11.5–14.5)
GLOBULIN SER-MCNC: 3.1 G/DL (ref 2–4)
GLUCOSE SERPL-MCNC: 250 MG/DL (ref 74–106)
GLUCOSE SERPL-MCNC: 292 MG/DL (ref 70–105)
GLUCOSE SERPL-MCNC: 318 MG/DL (ref 70–105)
HCT VFR BLD AUTO: 32.1 % (ref 38–47)
HGB BLD-MCNC: 10.4 G/DL (ref 12–16)
IMM GRANULOCYTES # BLD AUTO: 0.02 K/UL (ref 0–0.04)
IMM GRANULOCYTES NFR BLD: 0.3 % (ref 0–0.4)
LYMPHOCYTES # BLD AUTO: 1.12 K/UL (ref 1–4.8)
LYMPHOCYTES NFR BLD AUTO: 15 % (ref 27–41)
MCH RBC QN AUTO: 29.2 PG (ref 27–31)
MCHC RBC AUTO-ENTMCNC: 32.4 G/DL (ref 32–36)
MCV RBC AUTO: 90.2 FL (ref 80–96)
MONOCYTES # BLD AUTO: 0.6 K/UL (ref 0–0.8)
MONOCYTES NFR BLD AUTO: 8 % (ref 2–6)
MPC BLD CALC-MCNC: 10.4 FL (ref 9.4–12.4)
NEUTROPHILS # BLD AUTO: 5.7 K/UL (ref 1.8–7.7)
NEUTROPHILS NFR BLD AUTO: 76.2 % (ref 53–65)
NRBC # BLD AUTO: 0 X10E3/UL
NRBC, AUTO (.00): 0 %
OHS QRS DURATION: 134 MS
OHS QRS DURATION: 138 MS
OHS QTC CALCULATION: 479 MS
OHS QTC CALCULATION: 490 MS
PLATELET # BLD AUTO: 128 K/UL (ref 150–400)
POTASSIUM SERPL-SCNC: 4.9 MMOL/L (ref 3.5–5.1)
PROT SERPL-MCNC: 5.7 G/DL (ref 6.4–8.2)
RBC # BLD AUTO: 3.56 M/UL (ref 4.2–5.4)
SODIUM SERPL-SCNC: 138 MMOL/L (ref 136–145)
WBC # BLD AUTO: 7.48 K/UL (ref 4.5–11)

## 2024-10-29 PROCEDURE — 36415 COLL VENOUS BLD VENIPUNCTURE: CPT

## 2024-10-29 PROCEDURE — 25000003 PHARM REV CODE 250

## 2024-10-29 PROCEDURE — 63600175 PHARM REV CODE 636 W HCPCS

## 2024-10-29 PROCEDURE — 11000001 HC ACUTE MED/SURG PRIVATE ROOM

## 2024-10-29 PROCEDURE — 97166 OT EVAL MOD COMPLEX 45 MIN: CPT

## 2024-10-29 PROCEDURE — 25000003 PHARM REV CODE 250: Performed by: FAMILY MEDICINE

## 2024-10-29 PROCEDURE — 97161 PT EVAL LOW COMPLEX 20 MIN: CPT

## 2024-10-29 PROCEDURE — 85025 COMPLETE CBC W/AUTO DIFF WBC: CPT

## 2024-10-29 PROCEDURE — 25000003 PHARM REV CODE 250: Performed by: ORTHOPAEDIC SURGERY

## 2024-10-29 PROCEDURE — 80053 COMPREHEN METABOLIC PANEL: CPT

## 2024-10-29 PROCEDURE — 82962 GLUCOSE BLOOD TEST: CPT

## 2024-10-29 PROCEDURE — 63600175 PHARM REV CODE 636 W HCPCS: Performed by: FAMILY MEDICINE

## 2024-10-29 RX ORDER — HYDROCODONE BITARTRATE AND ACETAMINOPHEN 7.5; 325 MG/1; MG/1
1 TABLET ORAL EVERY 6 HOURS PRN
Status: DISCONTINUED | OUTPATIENT
Start: 2024-10-29 | End: 2024-11-01 | Stop reason: HOSPADM

## 2024-10-29 RX ADMIN — ATORVASTATIN CALCIUM 40 MG: 40 TABLET, FILM COATED ORAL at 09:10

## 2024-10-29 RX ADMIN — FUROSEMIDE 20 MG: 20 TABLET ORAL at 08:10

## 2024-10-29 RX ADMIN — HYDROCODONE BITARTRATE AND ACETAMINOPHEN 2 TABLET: 5; 325 TABLET ORAL at 08:10

## 2024-10-29 RX ADMIN — HUMAN INSULIN 8 UNITS: 100 INJECTION, SUSPENSION SUBCUTANEOUS at 06:10

## 2024-10-29 RX ADMIN — CARVEDILOL 12.5 MG: 12.5 TABLET, FILM COATED ORAL at 08:10

## 2024-10-29 RX ADMIN — CARVEDILOL 12.5 MG: 12.5 TABLET, FILM COATED ORAL at 09:10

## 2024-10-29 RX ADMIN — MORPHINE SULFATE 2 MG: 2 INJECTION, SOLUTION INTRAMUSCULAR; INTRAVENOUS at 01:10

## 2024-10-29 RX ADMIN — HYDRALAZINE HYDROCHLORIDE 25 MG: 25 TABLET ORAL at 08:10

## 2024-10-29 RX ADMIN — HYDROCODONE BITARTRATE AND ACETAMINOPHEN 1 TABLET: 7.5; 325 TABLET ORAL at 10:10

## 2024-10-29 RX ADMIN — ONDANSETRON 4 MG: 2 INJECTION INTRAMUSCULAR; INTRAVENOUS at 02:10

## 2024-10-29 NOTE — PLAN OF CARE
Problem: Occupational Therapy  Goal: Occupational Therapy Goal  Description: STG:  Pt will perform grooming with setup  Pt will bathe with Min A  Pt will perform UE dressing with Mod I  Pt will perform LE dressing with Min A  Pt will sit EOB x 15 min with no assistance  Pt will transfer bed/chair/bsc with Mod I  Pt will perform standing task x 10 min with supervision assistance  Pt will tolerate 30 minutes of tx without fatigue      LT.Restore to max I with self care and mobility.    Outcome: Progressing

## 2024-10-29 NOTE — PROGRESS NOTES
[facility] - [facility] Telemetry  Wound Care    Patient Name:  Jayme Mcgraw   MRN:  [id number]  Date: 10/29/2024  Diagnosis: Fracture of left radius    History:     Past Medical History:   Diagnosis Date    Diabetes mellitus     Hypertension        Social History     Socioeconomic History    Marital status:    Tobacco Use    Smoking status: Never    Smokeless tobacco: Never   Substance and Sexual Activity    Alcohol use: Never    Drug use: Never    Sexual activity: Never     Social Drivers of Health     Financial Resource Strain: Low Risk  (10/28/2024)    Overall Financial Resource Strain (CARDIA)     Difficulty of Paying Living Expenses: Not very hard   Food Insecurity: No Food Insecurity (10/28/2024)    Hunger Vital Sign     Worried About Running Out of Food in the Last Year: Never true     Ran Out of Food in the Last Year: Never true   Transportation Needs: No Transportation Needs (10/28/2024)    TRANSPORTATION NEEDS     Transportation : No   Physical Activity: Inactive (10/28/2024)    Exercise Vital Sign     Days of Exercise per Week: 0 days     Minutes of Exercise per Session: 0 min   Stress: No Stress Concern Present (10/28/2024)    [employer] - Occupational Stress Questionnaire     Feeling of Stress : Only a little   Housing Stability: Low Risk  (10/28/2024)    Housing Stability Vital Sign     Unable to Pay for Housing in the Last Year: No     Homeless in the Last Year: No       Precautions:     Allergies as of 10/27/2024    (No Known Allergies)       [facility] Assessment Details/Treatment     Narrative: Seen patient for initiation of preventative skin care measures    Patient in bed. Alert. Bilateral heels with out pressure injury. Has Mepliex foam border to heels. Sacral is ok. No foam border noted. Has sling to left arm. Family at bedside. Has overlay to bed.   Connor score 21.    Consult wound care for any skin issues       10/29/2024

## 2024-10-29 NOTE — PLAN OF CARE
Problem: Physical Therapy  Goal: Physical Therapy Goal  Description: Short Term Goals to be met by: 24    Patient will increase functional independence with mobility by performin. Supine to sit with Stand by assist  2. Sit to stand transfer with contact guard assist using Roderick-walker  3. Bed to chair transfer with contact guard assist using Roderick-walker  4. Gait  x 100 feet with contact guard assist using lowest level of assistive device  5. Lower extremity exercise program x30 reps per handout, with assistance as needed    Long Term Goals to be met by: 24    Pt will regain full independent functional mobility with lowest level of assistive device to return to home situation and prior activities of daily living.   Outcome: Progressing       PT eval completed. Please see eval note for details.

## 2024-10-29 NOTE — ASSESSMENT & PLAN NOTE
Patients blood pressure range in the last 24 hours was:  Vitals:    10/28/24 1953 10/28/24 2337 10/29/24 0128 10/29/24 0351   BP: (!) 150/66 112/60 136/80 (!) 109/49    10/29/24 0748 10/29/24 1123 10/29/24 1145 10/29/24 1300   BP: 126/69 (!) 91/49 (!) 100/42 (!) 96/55    10/29/24 1618   BP: 108/60       The patient's inpatient anti-hypertensive regimen is listed below:    Current Antihypertensives  carvediloL tablet 12.5 mg, 2 times daily, Oral  furosemide tablet 20 mg, Daily, Oral    Plan  - BP is controlled, no changes needed to their regimen

## 2024-10-29 NOTE — SUBJECTIVE & OBJECTIVE
Interval History:     No significant events overnight, no new complaints or concerns. Awaiting PT/OT evaluation to further determine discharge needs.    Review of Systems   Constitutional:  Positive for fatigue. Negative for fever.   Respiratory:  Negative for cough and shortness of breath.    Cardiovascular:  Negative for chest pain.   Gastrointestinal:  Negative for abdominal pain, constipation, diarrhea, nausea and vomiting.   Musculoskeletal:  Positive for arthralgias and joint swelling.   Neurological:  Negative for weakness and headaches.     Objective:     Vital Signs (Most Recent):  Temp: 98.6 °F (37 °C) (10/29/24 1618)  Pulse: 78 (10/29/24 1618)  Resp: 18 (10/29/24 1618)  BP: 108/60 (10/29/24 1618)  SpO2: (!) 90 % (10/29/24 1618) Vital Signs (24h Range):  Temp:  [97.9 °F (36.6 °C)-99.4 °F (37.4 °C)] 98.6 °F (37 °C)  Pulse:  [66-89] 78  Resp:  [16-24] 18  SpO2:  [89 %-99 %] 90 %  BP: ()/(42-80) 108/60     Weight: 65.9 kg (145 lb 4.5 oz)  Body mass index is 27.45 kg/m².  No intake or output data in the 24 hours ending 10/29/24 1736        Physical Exam  Vitals and nursing note reviewed.   Eyes:      General: No scleral icterus.     Conjunctiva/sclera: Conjunctivae normal.   Cardiovascular:      Rate and Rhythm: Normal rate and regular rhythm.   Pulmonary:      Effort: Pulmonary effort is normal. No respiratory distress.   Abdominal:      General: Abdomen is flat.      Palpations: Abdomen is soft.      Tenderness: There is no abdominal tenderness.   Musculoskeletal:      Right forearm: Normal.      Left forearm: Swelling, deformity and tenderness present.      Right lower leg: Edema present.      Left lower leg: Edema present.   Skin:     General: Skin is warm.   Neurological:      Mental Status: She is alert. Mental status is at baseline.             Significant Labs: All pertinent labs within the past 24 hours have been reviewed.    Significant Imaging: I have reviewed all pertinent imaging  results/findings within the past 24 hours.

## 2024-10-29 NOTE — PT/OT/SLP EVAL
Occupational Therapy   Evaluation    Name: Jayme Mcgraw  MRN: [id number]  Admitting Diagnosis: Fracture of left radius  Recent Surgery: Procedure(s) (LRB):  ORIF, FRACTURE, RADIUS (Left)  DEBRIDEMENT, SKIN, FASCIA, MUSCLE, AND BONE, OPEN FRACTURE SITE (Left) 1 Day Post-Op    Recommendations:     Discharge Recommendations: Moderate Intensity Therapy. Pt would benefit from  services at time of D/C  Discharge Equipment Recommendations:     Barriers to discharge:  None    Assessment:     Jayme Mcgraw is a 89 y.o. female with a medical diagnosis of Fracture of left radius.  She presents with weakness S/P L radial ORIF. Performance deficits affecting function: decreased self care skills, decreased endurance, weakness gait instability .      Rehab Prognosis: Good; patient would benefit from acute skilled OT services to address these deficits and reach maximum level of function.       Plan:     Patient to be seen 5 x/week to address the above listed problems via self-care/home management, therapeutic activities, therapeutic exercises  Plan of Care Expires:    Plan of Care Reviewed with: patient, family    Subjective     Chief Complaint: Pt had no complaints  Patient/Family Comments/goals: return home    Occupational Profile:  Living Environment: Pt lives between daughter and granddaughter  Previous level of function: I will all self care skills  Roles and Routines: self care  Equipment Used at Home: walker, rolling  Assistance upon Discharge: Pt will assist from family    Pain/Comfort:  Pain Rating 1: 0/10    Patients cultural, spiritual, Anabaptism conflicts given the current situation:      Objective:     Communicated with: MAKAYLA Gunn prior to session.  Patient found HOB elevated with peripheral IV and LUE in splint and sling  upon OT entry to room.    General Precautions: Standard, fall  Orthopedic Precautions: LUE NWB   Braces: UE Sling  Respiratory Status: Room air    Occupational Performance:    Bed Mobility:     Patient completed Supine to Sit with supervision  Patient completed Sit to Supine with minimum assistance    Functional Mobility/Transfers:  Patient completed Sit <> Stand Transfer with moderate assistance  with  hand-held assist   Functional Mobility: Pt took 3-4 steps toward HOB with HHA and Mod A    Activities of Daily Living:      Cognitive/Visual Perceptual:  Cognitive/Psychosocial Skills:     -       Oriented to: Person, Place, and Situation   -       Follows Commands/attention:Follows multistep  commands    Physical Exam:  Upper Extremity Range of Motion:     -       Right Upper Extremity: WFL  -       Left Upper Extremity: Not fully tested d/t Fx.  Upper Extremity Strength:    -       Right Upper Extremity: WFL  -       Left Upper Extremity: nor fully tested    AMPAC 6 Click ADL:  AMPAC Total Score: 19    Treatment & Education:  Pt and family educated on role and scope of OT practice  Educated on NWB status  Not getting up without staff assist  All questions and concerns pertaining to OT answered    Patient left HOB elevated with all lines intact, call button in reach, and family present    GOALS:   Multidisciplinary Problems       Occupational Therapy Goals          Problem: Occupational Therapy    Goal Priority Disciplines Outcome Interventions   Occupational Therapy Goal     OT, PT/OT Progressing    Description: STG:  Pt will perform grooming with setup  Pt will bathe with Min A  Pt will perform UE dressing with Mod I  Pt will perform LE dressing with Min A  Pt will sit EOB x 15 min with no assistance  Pt will transfer bed/chair/bsc with Mod I  Pt will perform standing task x 10 min with supervision assistance  Pt will tolerate 30 minutes of tx without fatigue      LT.Restore to max I with self care and mobility.                         History:     Past Medical History:   Diagnosis Date    Diabetes mellitus     Hypertension          Past Surgical History:   Procedure Laterality Date     DEBRIDEMENT, SKIN, FASCIA, MUSCLE, AND BONE, OPEN FRACTURE SITE Left 10/28/2024    Procedure: DEBRIDEMENT, SKIN, FASCIA, MUSCLE, AND BONE, OPEN FRACTURE SITE;  Surgeon: Nabil Mckinley MD;  Location: [facility];  Service: Orthopedics;  Laterality: Left;    OPEN REDUCTION AND INTERNAL FIXATION (ORIF) OF FRACTURE OF RADIUS Left 10/28/2024    Procedure: ORIF, FRACTURE, RADIUS;  Surgeon: Nabil Mckinley MD;  Location: [facility];  Service: Orthopedics;  Laterality: Left;       Time Tracking:     OT Date of Treatment:    OT Start Time: 1100  OT Stop Time: 1129  OT Total Time (min): 29 min    Billable Minutes:Evaluation 29    10/29/2024

## 2024-10-29 NOTE — ASSESSMENT & PLAN NOTE
Post-op #1 and doing well. Awaiting PT/OT evaluation to further determine discharge needs. Patient does use a walker at home so will need to be sure she can safely ambulate considering NWB status of left UE.  ---  Xray shows distal radius fracture, Orthopedics consulted, will hold patient asa and plavix, last dose of plavix was on Saturday evening.     RCRI score 3 15% 30 day risk of death MI or cardiac arrest, will get BNP in morning, if greater than 300 will need EKG after surgery and troponins trended daily for 24-48 hours after surgery. EKG unremarkable. No obvious contraindications to surgery or anesthesia.

## 2024-10-29 NOTE — PROGRESS NOTES
[facility] - [address] [place] [address] Medicine  Progress Note    Patient Name: Jayme Mcgraw  MRN: [id number]  Patient Class: IP- Inpatient   Admission Date: 10/27/2024  Length of Stay: 2 days  Attending Physician: Cara Ramirez DO  Primary Care Provider: Erin, Primary Doctor        Subjective:     Principal Problem:Fracture of left radius        HPI:  Patient is a 89 yo F who presents to [facility] after a fall. Patient reports she tripped over her great grandchild and fell on her outstretched left hand. Patient has a PMHx of HTN, CHF, CAD with CABG.      Initial presenting vitals in the ED HR 57, /80, Temp 97.6, Spo2 88% on RA. Initial presenting labs WBC 6.15, H/H 11.7/36.7, Platelet 139, PT 4.1, INR 1.10, Ptt 31.3, Na 136, K 4.6, Cl 102, Co2 32, Glu 300, Ca 8.8, BUN/Cr 17/1.08 CT head and neck negative for fractures.CXR cardiomegaly Xray wrist  complex fracture of distal radius and ulna. Orthopedic surgery consulted, start on ancef.      This patient will be admitted to [facility] for continued medical management.      Overview/Hospital Course:  No notes on file    Interval History:     No significant events overnight, no new complaints or concerns. Awaiting PT/OT evaluation to further determine discharge needs.    Review of Systems   Constitutional:  Positive for fatigue. Negative for fever.   Respiratory:  Negative for cough and shortness of breath.    Cardiovascular:  Negative for chest pain.   Gastrointestinal:  Negative for abdominal pain, constipation, diarrhea, nausea and vomiting.   Musculoskeletal:  Positive for arthralgias and joint swelling.   Neurological:  Negative for weakness and headaches.     Objective:     Vital Signs (Most Recent):  Temp: 98.6 °F (37 °C) (10/29/24 1618)  Pulse: 78 (10/29/24 1618)  Resp: 18 (10/29/24 1618)  BP: 108/60 (10/29/24 1618)  SpO2: (!) 90 % (10/29/24 1618) Vital Signs (24h Range):  Temp:  [97.9 °F (36.6  °C)-99.4 °F (37.4 °C)] 98.6 °F (37 °C)  Pulse:  [66-89] 78  Resp:  [16-24] 18  SpO2:  [89 %-99 %] 90 %  BP: ()/(42-80) 108/60     Weight: 65.9 kg (145 lb 4.5 oz)  Body mass index is 27.45 kg/m².  No intake or output data in the 24 hours ending 10/29/24 0706        Physical Exam  Vitals and nursing note reviewed.   Eyes:      General: No scleral icterus.     Conjunctiva/sclera: Conjunctivae normal.   Cardiovascular:      Rate and Rhythm: Normal rate and regular rhythm.   Pulmonary:      Effort: Pulmonary effort is normal. No respiratory distress.   Abdominal:      General: Abdomen is flat.      Palpations: Abdomen is soft.      Tenderness: There is no abdominal tenderness.   Musculoskeletal:      Right forearm: Normal.      Left forearm: Swelling, deformity and tenderness present.      Right lower leg: Edema present.      Left lower leg: Edema present.   Skin:     General: Skin is warm.   Neurological:      Mental Status: She is alert. Mental status is at baseline.             Significant Labs: All pertinent labs within the past 24 hours have been reviewed.    Significant Imaging: I have reviewed all pertinent imaging results/findings within the past 24 hours.    Assessment/Plan:      * Fracture of left radius  Post-op #1 and doing well. Awaiting PT/OT evaluation to further determine discharge needs. Patient does use a walker at home so will need to be sure she can safely ambulate considering NWB status of left UE.  ---  Xray shows distal radius fracture, Orthopedics consulted, will hold patient asa and plavix, last dose of plavix was on Saturday evening.     RCRI score 3 15% 30 day risk of death MI or cardiac arrest, will get BNP in morning, if greater than 300 will need EKG after surgery and troponins trended daily for 24-48 hours after surgery. EKG unremarkable. No obvious contraindications to surgery or anesthesia.       Fracture of left ulna  See plan above, xray shows distal ulna fracture.       HTN  "(hypertension)  Patients blood pressure range in the last 24 hours was:  Vitals:    10/28/24 1953 10/28/24 2337 10/29/24 0128 10/29/24 0351   BP: (!) 150/66 112/60 136/80 (!) 109/49    10/29/24 0748 10/29/24 1123 10/29/24 1145 10/29/24 1300   BP: 126/69 (!) 91/49 (!) 100/42 (!) 96/55    10/29/24 1618   BP: 108/60       The patient's inpatient anti-hypertensive regimen is listed below:    Current Antihypertensives  carvediloL tablet 12.5 mg, 2 times daily, Oral  furosemide tablet 20 mg, Daily, Oral    Plan  - BP is controlled, no changes needed to their regimen      Coronary artery disease involving coronary bypass graft  Patient with known CAD s/p CABG, which is controlled Will continue ASA, Plavix, and Statin and monitor for S/Sx of angina/ACS. Continue to monitor on telemetry.   Will hold plavix, asa for surgery at this time.     Type 2 diabetes mellitus, without long-term current use of insulin  Patient's FSGs are uncontrolled due to hyperglycemia on current medication regimen.     Last A1c reviewed-   Lab Results   Component Value Date    HGBA1C 7.7 (H) 10/28/2024     Most recent fingerstick glucose reviewed- No results for input(s): "POCTGLUCOSE" in the last 24 hours.  Current correctional scale  Low  Increase anti-hyperglycemic dose as follows-   Antihyperglycemics (From admission, onward)      Start     Stop Route Frequency Ordered    10/29/24 1845  insulin NPH-insulin regular (70/30) injection 8 Units         -- SubQ 2 times daily before meals 10/29/24 1739    10/27/24 2039  insulin aspart U-100 injection 0-5 Units         -- SubQ Before meals & nightly PRN 10/27/24 1943          Hold Oral hypoglycemics while patient is in the hospital.          VTE Risk Mitigation (From admission, onward)           Ordered     IP VTE HIGH RISK PATIENT  Once         10/27/24 1943     Place sequential compression device  Until discontinued         10/27/24 1943                    Discharge Planning   DEON:      Code Status: " Full Code   Is the patient medically ready for discharge?:     Reason for patient still in hospital (select all that apply): Treatment  Discharge Plan A: Home with family, Home Health                  Cara Ramirez DO  Department of Hospital Medicine   [facility] - [facility] Telemetry

## 2024-10-29 NOTE — PT/OT/SLP EVAL
Physical Therapy Evaluation     Patient Name: Jayme Mcgraw   MRN: [id number]  Recent Surgery: Procedure(s) (LRB):  ORIF, FRACTURE, RADIUS (Left)  DEBRIDEMENT, SKIN, FASCIA, MUSCLE, AND BONE, OPEN FRACTURE SITE (Left) 1 Day Post-Op    Recommendations:     Discharge Recommendations: Moderate Intensity Therapy   Discharge Equipment Recommendations: cane, straight   Barriers to discharge: Ongoing medical treatment    Assessment:     Jayme Mcgraw is a 89 y.o. female admitted with a medical diagnosis of Fracture of left radius. She presents with the following impairments/functional limitations: weakness, impaired endurance, impaired functional mobility, gait instability, impaired balance, decreased safety awareness, orthopedic precautions. Prior to July, pt lived alone and was indep in all act but was then hospitalized and has been living between family since then. Pt splits her time b/w her granddaughter's home and her daughter's home (2wk intervals). One lives in [place], [place] and the other in [place], [place]. Family has expressed desire to utilize a home health company who services both areas.   Rehab Prognosis: Good; patient would benefit from acute PT services to address these deficits and reach maximum level of function.    Plan:     During this hospitalization, patient to be seen 5 x/week to address the above listed problems via gait training, therapeutic activities, therapeutic exercises    Plan of Care Expires: 11/29/24    Subjective     Chief Complaint: fractuer of L radius  Patient Comments/Goals: agreeable to eval  Pain/Comfort:  Pain Rating 1: 0/10    Social History:  Living Environment: Patient lives with their child(delaney) in a single story home with number of outside stair(s): 1-2 at each location  Prior Level of Function: Prior to admission, patient was modified independent with ADLs using rolling walker for mobility  Equipment Used at Home: walker, rolling  DME owned (not currently used): none  Assistance  Upon Discharge: family    Objective:     Communicated with OTR prior to session. Patient found HOB elevated with oxygen, peripheral IV upon PT entry to room.    General Precautions: Standard, fall   Orthopedic Precautions: LUE non weight bearing   Braces: UE Sling    Respiratory Status: Nasal cannula, flow 2 L/min    Exams:  RLE ROM: WFL  RLE Strength: WFL  LLE ROM: WFL  LLE Strength: WFL  Cognitive: Patient is oriented to Person  Sensation: -       Intact    Functional Mobility:  Gait belt applied - Yes  Bed Mobility  Supine to Sit: contact guard assistance for safety  Sit to Supine: minimum assistance for LE management  Transfers  Sit to Stand: moderate assistance with hand-held assist and with cues for hand placement, foot placement, and weight bearing precautions  Gait  Patient ambulated sidesteps to HOB with hand-held assist and moderate assistance. Patient required cues for sequencing, upright posture, and weight bearing status to increase independence and safety. Patient required cues ~ 25% of the time.  Balance  Sitting: FAIR+: Maintains balance through MINIMAL excursions of active trunk motion  Standing: POOR: Needs MOD (moderate) assist during gait      Therapeutic Activities and Exercises:  Patient educated on role of acute care PT and PT POC, safety while in hospital including calling nurse for mobility, and call light usage.  Educated about weightbearing precautions and provided cuing for adherence as appropriate during session.  Educated about importance of OOB mobility and remaining up in chair most of the day.  Edcuated about pursed lip breathing technique and cued for use with mobility.      AM-PAC 6 CLICK MOBILITY  Total Score:15    Patient left HOB elevated with all lines intact, call button in reach, and family present.    GOALS:   Multidisciplinary Problems       Physical Therapy Goals          Problem: Physical Therapy    Goal Priority Disciplines Outcome Interventions   Physical Therapy Goal      PT, PT/OT Progressing    Description: Short Term Goals to be met by: 24    Patient will increase functional independence with mobility by performin. Supine to sit with Stand by assist  2. Sit to stand transfer with contact guard assist using Roderick-walker  3. Bed to chair transfer with contact guard assist using Roderick-walker  4. Gait  x 100 feet with contact guard assist using lowest level of assistive device  5. Lower extremity exercise program x30 reps per handout, with assistance as needed    Long Term Goals to be met by: 24    Pt will regain full independent functional mobility with lowest level of assistive device to return to home situation and prior activities of daily living.                        History:     Past Medical History:   Diagnosis Date    Diabetes mellitus     Hypertension        Past Surgical History:   Procedure Laterality Date    DEBRIDEMENT, SKIN, FASCIA, MUSCLE, AND BONE, OPEN FRACTURE SITE Left 10/28/2024    Procedure: DEBRIDEMENT, SKIN, FASCIA, MUSCLE, AND BONE, OPEN FRACTURE SITE;  Surgeon: Nabil Mckinley MD;  Location: [facility] OR;  Service: Orthopedics;  Laterality: Left;    OPEN REDUCTION AND INTERNAL FIXATION (ORIF) OF FRACTURE OF RADIUS Left 10/28/2024    Procedure: ORIF, FRACTURE, RADIUS;  Surgeon: Nabil Mckinley MD;  Location: [facility] OR;  Service: Orthopedics;  Laterality: Left;       Time Tracking:     PT Received On: 10/29/24  PT Start Time: 1119  PT Stop Time: 1130  PT Total Time (min): 11 min     Billable Minutes: Evaluation low complexity    10/29/2024

## 2024-10-29 NOTE — ASSESSMENT & PLAN NOTE
"Patient's FSGs are uncontrolled due to hyperglycemia on current medication regimen.     Last A1c reviewed-   Lab Results   Component Value Date    HGBA1C 7.7 (H) 10/28/2024     Most recent fingerstick glucose reviewed- No results for input(s): "POCTGLUCOSE" in the last 24 hours.  Current correctional scale  Low  Increase anti-hyperglycemic dose as follows-   Antihyperglycemics (From admission, onward)      Start     Stop Route Frequency Ordered    10/29/24 1845  insulin NPH-insulin regular (70/30) injection 8 Units         -- SubQ 2 times daily before meals 10/29/24 1739    10/27/24 2039  insulin aspart U-100 injection 0-5 Units         -- SubQ Before meals & nightly PRN 10/27/24 1943          Hold Oral hypoglycemics while patient is in the hospital.      "

## 2024-10-30 PROBLEM — S52.92XA FRACTURE OF LEFT RADIUS: Status: RESOLVED | Noted: 2024-10-27 | Resolved: 2024-10-30

## 2024-10-30 PROBLEM — S52.502A CLOSED FRACTURE OF LEFT DISTAL RADIUS: Status: ACTIVE | Noted: 2024-10-30

## 2024-10-30 PROBLEM — S52.202A FRACTURE OF LEFT ULNA: Status: RESOLVED | Noted: 2024-10-27 | Resolved: 2024-10-30

## 2024-10-30 LAB
ALBUMIN SERPL BCP-MCNC: 2.5 G/DL (ref 3.5–5)
ALBUMIN/GLOB SERPL: 1 {RATIO}
ALP SERPL-CCNC: 60 U/L (ref 55–142)
ALT SERPL W P-5'-P-CCNC: 7 U/L (ref 13–56)
ANION GAP SERPL CALCULATED.3IONS-SCNC: 6 MMOL/L (ref 7–16)
AST SERPL W P-5'-P-CCNC: 21 U/L (ref 15–37)
BASOPHILS # BLD AUTO: 0.01 K/UL (ref 0–0.2)
BASOPHILS NFR BLD AUTO: 0.2 % (ref 0–1)
BILIRUB SERPL-MCNC: 0.6 MG/DL (ref ?–1.2)
BUN SERPL-MCNC: 15 MG/DL (ref 7–18)
BUN/CREAT SERPL: 12 (ref 6–20)
CALCIUM SERPL-MCNC: 8.1 MG/DL (ref 8.5–10.1)
CHLORIDE SERPL-SCNC: 102 MMOL/L (ref 98–107)
CO2 SERPL-SCNC: 32 MMOL/L (ref 21–32)
CREAT SERPL-MCNC: 1.27 MG/DL (ref 0.55–1.02)
DIFFERENTIAL METHOD BLD: ABNORMAL
EGFR (NO RACE VARIABLE) (RUSH/TITUS): 39 ML/MIN/1.73M2
EOSINOPHIL # BLD AUTO: 0.01 K/UL (ref 0–0.5)
EOSINOPHIL NFR BLD AUTO: 0.2 % (ref 1–4)
ERYTHROCYTE [DISTWIDTH] IN BLOOD BY AUTOMATED COUNT: 12.8 % (ref 11.5–14.5)
GLOBULIN SER-MCNC: 2.6 G/DL (ref 2–4)
GLUCOSE SERPL-MCNC: 147 MG/DL (ref 70–105)
GLUCOSE SERPL-MCNC: 184 MG/DL (ref 74–106)
GLUCOSE SERPL-MCNC: 198 MG/DL (ref 70–105)
GLUCOSE SERPL-MCNC: 214 MG/DL (ref 70–105)
GLUCOSE SERPL-MCNC: 219 MG/DL (ref 70–105)
HCT VFR BLD AUTO: 27.2 % (ref 38–47)
HGB BLD-MCNC: 9.2 G/DL (ref 12–16)
IMM GRANULOCYTES # BLD AUTO: 0.01 K/UL (ref 0–0.04)
IMM GRANULOCYTES NFR BLD: 0.2 % (ref 0–0.4)
LYMPHOCYTES # BLD AUTO: 1.25 K/UL (ref 1–4.8)
LYMPHOCYTES NFR BLD AUTO: 22.4 % (ref 27–41)
MCH RBC QN AUTO: 29.3 PG (ref 27–31)
MCHC RBC AUTO-ENTMCNC: 33.8 G/DL (ref 32–36)
MCV RBC AUTO: 86.6 FL (ref 80–96)
MONOCYTES # BLD AUTO: 0.59 K/UL (ref 0–0.8)
MONOCYTES NFR BLD AUTO: 10.6 % (ref 2–6)
MPC BLD CALC-MCNC: 10.8 FL (ref 9.4–12.4)
NEUTROPHILS # BLD AUTO: 3.7 K/UL (ref 1.8–7.7)
NEUTROPHILS NFR BLD AUTO: 66.4 % (ref 53–65)
NRBC # BLD AUTO: 0 X10E3/UL
NRBC, AUTO (.00): 0 %
PLATELET # BLD AUTO: 121 K/UL (ref 150–400)
POTASSIUM SERPL-SCNC: 4.1 MMOL/L (ref 3.5–5.1)
PROT SERPL-MCNC: 5.1 G/DL (ref 6.4–8.2)
RBC # BLD AUTO: 3.14 M/UL (ref 4.2–5.4)
SODIUM SERPL-SCNC: 136 MMOL/L (ref 136–145)
WBC # BLD AUTO: 5.57 K/UL (ref 4.5–11)

## 2024-10-30 PROCEDURE — 97116 GAIT TRAINING THERAPY: CPT

## 2024-10-30 PROCEDURE — 99900035 HC TECH TIME PER 15 MIN (STAT)

## 2024-10-30 PROCEDURE — 36415 COLL VENOUS BLD VENIPUNCTURE: CPT

## 2024-10-30 PROCEDURE — 11000001 HC ACUTE MED/SURG PRIVATE ROOM

## 2024-10-30 PROCEDURE — 94761 N-INVAS EAR/PLS OXIMETRY MLT: CPT

## 2024-10-30 PROCEDURE — 99232 SBSQ HOSP IP/OBS MODERATE 35: CPT | Mod: ,,, | Performed by: FAMILY MEDICINE

## 2024-10-30 PROCEDURE — 25000003 PHARM REV CODE 250: Performed by: FAMILY MEDICINE

## 2024-10-30 PROCEDURE — 97110 THERAPEUTIC EXERCISES: CPT | Mod: CO

## 2024-10-30 PROCEDURE — 82962 GLUCOSE BLOOD TEST: CPT

## 2024-10-30 PROCEDURE — 27000221 HC OXYGEN, UP TO 24 HOURS

## 2024-10-30 PROCEDURE — 63600175 PHARM REV CODE 636 W HCPCS: Performed by: FAMILY MEDICINE

## 2024-10-30 PROCEDURE — 63600175 PHARM REV CODE 636 W HCPCS

## 2024-10-30 PROCEDURE — 97110 THERAPEUTIC EXERCISES: CPT

## 2024-10-30 PROCEDURE — 25000003 PHARM REV CODE 250

## 2024-10-30 PROCEDURE — 85025 COMPLETE CBC W/AUTO DIFF WBC: CPT

## 2024-10-30 PROCEDURE — 80053 COMPREHEN METABOLIC PANEL: CPT

## 2024-10-30 RX ADMIN — HYDROCODONE BITARTRATE AND ACETAMINOPHEN 1 TABLET: 7.5; 325 TABLET ORAL at 09:10

## 2024-10-30 RX ADMIN — HUMAN INSULIN 8 UNITS: 100 INJECTION, SUSPENSION SUBCUTANEOUS at 05:10

## 2024-10-30 RX ADMIN — CARVEDILOL 12.5 MG: 12.5 TABLET, FILM COATED ORAL at 09:10

## 2024-10-30 RX ADMIN — ATORVASTATIN CALCIUM 40 MG: 40 TABLET, FILM COATED ORAL at 09:10

## 2024-10-30 RX ADMIN — FUROSEMIDE 20 MG: 20 TABLET ORAL at 09:10

## 2024-10-30 RX ADMIN — HUMAN INSULIN 8 UNITS: 100 INJECTION, SUSPENSION SUBCUTANEOUS at 04:10

## 2024-10-30 RX ADMIN — INSULIN ASPART 1 UNITS: 100 INJECTION, SOLUTION INTRAVENOUS; SUBCUTANEOUS at 09:10

## 2024-10-30 NOTE — PLAN OF CARE
Problem: Adult Inpatient Plan of Care  Goal: Plan of Care Review  Outcome: Progressing  Flowsheets (Taken 10/30/2024 1033)  Plan of Care Reviewed With: patient  Goal: Absence of Hospital-Acquired Illness or Injury  Outcome: Progressing  Intervention: Identify and Manage Fall Risk  Flowsheets (Taken 10/30/2024 1033)  Safety Promotion/Fall Prevention:   assistive device/personal item within reach   bed alarm set   side rails raised x 3   nonskid shoes/socks when out of bed   medications reviewed  Intervention: Prevent Skin Injury  Flowsheets (Taken 10/30/2024 1033)  Body Position: weight shifting  Skin Protection: incontinence pads utilized  Intervention: Prevent and Manage VTE (Venous Thromboembolism) Risk  Flowsheets (Taken 10/30/2024 1033)  VTE Prevention/Management:   remove, assess skin, and reapply sequential compression device   ambulation promoted  Intervention: Prevent Infection  Flowsheets (Taken 10/30/2024 1033)  Infection Prevention:   cohorting utilized   environmental surveillance performed   equipment surfaces disinfected   hand hygiene promoted   single patient room provided   rest/sleep promoted   personal protective equipment utilized  Goal: Optimal Comfort and Wellbeing  Outcome: Progressing  Intervention: Monitor Pain and Promote Comfort  Flowsheets (Taken 10/30/2024 1033)  Pain Management Interventions: quiet environment facilitated  Intervention: Provide Person-Centered Care  Flowsheets (Taken 10/30/2024 1033)  Trust Relationship/Rapport:   care explained   choices provided   thoughts/feelings acknowledged     Problem: Wound  Goal: Optimal Coping  Outcome: Progressing  Intervention: Support Patient and Family Response  Flowsheets (Taken 10/30/2024 1033)  Supportive Measures: active listening utilized  Family/Support System Care: self-care encouraged  Goal: Optimal Functional Ability  Outcome: Progressing  Intervention: Optimize Functional Ability  Flowsheets (Taken 10/30/2024 1033)  Activity  Management: Arm raise - L1  Activity Assistance Provided: assistance, 1 person  Goal: Optimal Pain Control and Function  Outcome: Progressing  Intervention: Prevent or Manage Pain  Flowsheets (Taken 10/30/2024 1033)  Pain Management Interventions: quiet environment facilitated     Problem: Diabetes Comorbidity  Goal: Blood Glucose Level Within Targeted Range  Outcome: Progressing  Intervention: Monitor and Manage Glycemia  Flowsheets (Taken 10/30/2024 1033)  Glycemic Management: blood glucose monitored

## 2024-10-30 NOTE — PT/OT/SLP PROGRESS
Occupational Therapy   Treatment    Name: Jayme Mcgraw  MRN: [id number]  Admitting Diagnosis:  Fracture of left radius  2 Days Post-Op    Recommendations:     Discharge Recommendations: Moderate Intensity Therapy  Discharge Equipment Recommendations:     Barriers to discharge:       Assessment:     Jayme Mcgraw is a 89 y.o. female with a medical diagnosis of Fracture of left radius.Performance deficits affecting function are weakness, impaired endurance, impaired self care skills, orthopedic precautions.     Rehab Prognosis:  Good; patient would benefit from acute skilled OT services to address these deficits and reach maximum level of function.       Plan:     Patient to be seen 5 x/week to address the above listed problems via self-care/home management, therapeutic activities, therapeutic exercises  Plan of Care Expires:    Plan of Care Reviewed with: patient, family    Subjective     Chief Complaint:   Patient/Family Comments/goals:   Pain/Comfort:  Pain Rating 1: 2/10  Location - Side 1: Left  Location 1: arm  Pain Addressed 1: Distraction    Objective:     Communicated with: Luis A Viveros RN prior to session.  Patient found HOB elevated with peripheral IV, oxygen upon OT entry to room.    General Precautions: Standard, fall    Orthopedic Precautions:LUE non weight bearing  Braces: UE Sling  Respiratory Status: Nasal cannula, flow 2 L/min     Occupational Performance:     Bed Mobility:         Functional Mobility/Transfers:    Functional Mobility:     Activities of Daily Living:        [facility] 6 Click ADL:      Treatment & Education:  Pt performed hand helper with 2 rubber band resistances 20 reps x  2 on R,green t ball ex's 20 reps x 2 on R,rom ex's with 1 lb wt 15 reps x 2 on R shld flex,abd/add,elbow flex/ext,wrist flex, red t band on R 15 reps x 2 elbow flex/ext     Patient left HOB elevated with all lines intact, call button in reach, and 2 family member  present    GOALS:   Multidisciplinary Problems        Occupational Therapy Goals          Problem: Occupational Therapy    Goal Priority Disciplines Outcome Interventions   Occupational Therapy Goal     OT, PT/OT Progressing    Description: STG:  Pt will perform grooming with setup  Pt will bathe with Min A  Pt will perform UE dressing with Mod I  Pt will perform LE dressing with Min A  Pt will sit EOB x 15 min with no assistance  Pt will transfer bed/chair/bsc with Mod I  Pt will perform standing task x 10 min with supervision assistance  Pt will tolerate 30 minutes of tx without fatigue      LT.Restore to max I with self care and mobility.                         Time Tracking:     OT Date of Treatment: 10/30/24  OT Start Time: 1057  OT Stop Time: 1123  OT Total Time (min): 26 min    Billable Minutes:Therapeutic Exercise 25    OT/SHEN: SHEN          10/30/2024

## 2024-10-30 NOTE — PROGRESS NOTES
[facility] - [address] [place] [address] Medicine  Progress Note    Patient Name: Jayme Mcgraw  MRN: [id number]  Patient Class: IP- Inpatient   Admission Date: 10/27/2024  Length of Stay: 3 days  Attending Physician: Cara Ramirez DO  Primary Care Provider: Erin, Primary Doctor        Subjective:     Principal Problem:Fracture of left radius        HPI:  Patient is a 89 yo F who presents to [facility] after a fall. Patient reports she tripped over her great grandchild and fell on her outstretched left hand. Patient has a PMHx of HTN, CHF, CAD with CABG.      Initial presenting vitals in the ED HR 57, /80, Temp 97.6, Spo2 88% on RA. Initial presenting labs WBC 6.15, H/H 11.7/36.7, Platelet 139, PT 4.1, INR 1.10, Ptt 31.3, Na 136, K 4.6, Cl 102, Co2 32, Glu 300, Ca 8.8, BUN/Cr 17/1.08 CT head and neck negative for fractures.CXR cardiomegaly Xray wrist  complex fracture of distal radius and ulna. Orthopedic surgery consulted, start on ancef.      This patient will be admitted to [facility] for continued medical management.      Overview/Hospital Course:  No notes on file    Interval History:     No significant events overnight, no new complaints or concerns. Doing well from a medical standpoint however currently unable to care for herself at home. Would benefit from SWB however she and family undecided at present.     Review of Systems   Constitutional:  Positive for fatigue. Negative for fever.   Respiratory:  Negative for cough and shortness of breath.    Cardiovascular:  Negative for chest pain.   Gastrointestinal:  Negative for abdominal pain, constipation, diarrhea, nausea and vomiting.   Musculoskeletal:  Positive for arthralgias. Negative for joint swelling.   Neurological:  Negative for weakness and headaches.     Objective:     Vital Signs (Most Recent):  Temp: 98.3 °F (36.8 °C) (10/30/24 1052)  Pulse: 69 (10/30/24 1052)  Resp: 18 (10/30/24 1052)  BP:  126/67 (10/30/24 1052)  SpO2: 99 % (10/30/24 1052) Vital Signs (24h Range):  Temp:  [97.2 °F (36.2 °C)-98.6 °F (37 °C)] 98.3 °F (36.8 °C)  Pulse:  [64-81] 69  Resp:  [16-18] 18  SpO2:  [90 %-99 %] 99 %  BP: (105-126)/(58-69) 126/67     Weight: 65.9 kg (145 lb 4.5 oz)  Body mass index is 27.45 kg/m².  No intake or output data in the 24 hours ending 10/30/24 1355        Physical Exam  Vitals and nursing note reviewed.   Eyes:      General: No scleral icterus.     Conjunctiva/sclera: Conjunctivae normal.   Cardiovascular:      Rate and Rhythm: Normal rate and regular rhythm.   Pulmonary:      Effort: Pulmonary effort is normal. No respiratory distress.   Abdominal:      General: Abdomen is flat.      Palpations: Abdomen is soft.      Tenderness: There is no abdominal tenderness.   Musculoskeletal:      Right forearm: Normal.      Right lower leg: Edema present.      Left lower leg: Edema present.   Skin:     General: Skin is warm.   Neurological:      Mental Status: She is alert. Mental status is at baseline.             Significant Labs: All pertinent labs within the past 24 hours have been reviewed.    Significant Imaging: I have reviewed all pertinent imaging results/findings within the past 24 hours.    Assessment/Plan:      HTN (hypertension)  Patients blood pressure range in the last 24 hours was:  Vitals:    10/29/24 1618 10/29/24 1918 10/29/24 2102 10/29/24 2344   BP: 108/60 117/69 117/69 114/69    10/30/24 0410 10/30/24 0706 10/30/24 1052   BP: (!) 105/58 120/62 126/67       The patient's inpatient anti-hypertensive regimen is listed below:    Current Antihypertensives  carvediloL tablet 12.5 mg, 2 times daily, Oral  furosemide tablet 20 mg, Daily, Oral    Plan  - BP is controlled, no changes needed to their regimen      Coronary artery disease involving coronary bypass graft  Patient with known CAD s/p CABG, which is controlled Will continue ASA, Plavix, and Statin and monitor for S/Sx of angina/ACS. Continue  "to monitor on telemetry.   Will hold plavix, asa for surgery at this time.     Type 2 diabetes mellitus, without long-term current use of insulin  Patient's FSGs are controlled on current medication regimen.     Last A1c reviewed-   Lab Results   Component Value Date    HGBA1C 7.7 (H) 10/28/2024     Most recent fingerstick glucose reviewed- No results for input(s): "POCTGLUCOSE" in the last 24 hours.  Current correctional scale  Low  Maintain anti-hyperglycemic dose as follows-   Antihyperglycemics (From admission, onward)      Start     Stop Route Frequency Ordered    10/29/24 1845  insulin NPH-insulin regular (70/30) injection 8 Units         -- SubQ 2 times daily before meals 10/29/24 1739    10/27/24 2039  insulin aspart U-100 injection 0-5 Units         -- SubQ Before meals & nightly PRN 10/27/24 1943          Hold Oral hypoglycemics while patient is in the hospital.          VTE Risk Mitigation (From admission, onward)           Ordered     IP VTE HIGH RISK PATIENT  Once         10/27/24 1943     Place sequential compression device  Until discontinued         10/27/24 1943                    Discharge Planning   DEON:      Code Status: Full Code   Is the patient medically ready for discharge?:     Reason for patient still in hospital (select all that apply): Treatment  Discharge Plan A: Home with family, Home Health                  Cara Ramirez DO  Department of [facility] Medicine   [facility] - [address] [place], [place] [address]etry    "

## 2024-10-30 NOTE — ASSESSMENT & PLAN NOTE
"Patient's FSGs are controlled on current medication regimen.     Last A1c reviewed-   Lab Results   Component Value Date    HGBA1C 7.7 (H) 10/28/2024     Most recent fingerstick glucose reviewed- No results for input(s): "POCTGLUCOSE" in the last 24 hours.  Current correctional scale  Low  Maintain anti-hyperglycemic dose as follows-   Antihyperglycemics (From admission, onward)      Start     Stop Route Frequency Ordered    10/29/24 1845  insulin NPH-insulin regular (70/30) injection 8 Units         -- SubQ 2 times daily before meals 10/29/24 1739    10/27/24 2039  insulin aspart U-100 injection 0-5 Units         -- SubQ Before meals & nightly PRN 10/27/24 1943          Hold Oral hypoglycemics while patient is in the hospital.      "

## 2024-10-30 NOTE — ASSESSMENT & PLAN NOTE
Patients blood pressure range in the last 24 hours was:  Vitals:    10/29/24 1618 10/29/24 1918 10/29/24 2102 10/29/24 2344   BP: 108/60 117/69 117/69 114/69    10/30/24 0410 10/30/24 0706 10/30/24 1052   BP: (!) 105/58 120/62 126/67       The patient's inpatient anti-hypertensive regimen is listed below:    Current Antihypertensives  carvediloL tablet 12.5 mg, 2 times daily, Oral  furosemide tablet 20 mg, Daily, Oral    Plan  - BP is controlled, no changes needed to their regimen

## 2024-10-30 NOTE — PLAN OF CARE
Consult placed for platform walker for patient.  Referral faxed to [facility] and Cait was notified.  CM will continue to follow.

## 2024-10-30 NOTE — SUBJECTIVE & OBJECTIVE
Interval History:     No significant events overnight, no new complaints or concerns. Doing well from a medical standpoint however currently unable to care for herself at home. Would benefit from SWB however she and family undecided at present.     Review of Systems   Constitutional:  Positive for fatigue. Negative for fever.   Respiratory:  Negative for cough and shortness of breath.    Cardiovascular:  Negative for chest pain.   Gastrointestinal:  Negative for abdominal pain, constipation, diarrhea, nausea and vomiting.   Musculoskeletal:  Positive for arthralgias. Negative for joint swelling.   Neurological:  Negative for weakness and headaches.     Objective:     Vital Signs (Most Recent):  Temp: 98.3 °F (36.8 °C) (10/30/24 1052)  Pulse: 69 (10/30/24 1052)  Resp: 18 (10/30/24 1052)  BP: 126/67 (10/30/24 1052)  SpO2: 99 % (10/30/24 1052) Vital Signs (24h Range):  Temp:  [97.2 °F (36.2 °C)-98.6 °F (37 °C)] 98.3 °F (36.8 °C)  Pulse:  [64-81] 69  Resp:  [16-18] 18  SpO2:  [90 %-99 %] 99 %  BP: (105-126)/(58-69) 126/67     Weight: 65.9 kg (145 lb 4.5 oz)  Body mass index is 27.45 kg/m².  No intake or output data in the 24 hours ending 10/30/24 1355        Physical Exam  Vitals and nursing note reviewed.   Eyes:      General: No scleral icterus.     Conjunctiva/sclera: Conjunctivae normal.   Cardiovascular:      Rate and Rhythm: Normal rate and regular rhythm.   Pulmonary:      Effort: Pulmonary effort is normal. No respiratory distress.   Abdominal:      General: Abdomen is flat.      Palpations: Abdomen is soft.      Tenderness: There is no abdominal tenderness.   Musculoskeletal:      Right forearm: Normal.      Right lower leg: Edema present.      Left lower leg: Edema present.   Skin:     General: Skin is warm.   Neurological:      Mental Status: She is alert. Mental status is at baseline.             Significant Labs: All pertinent labs within the past 24 hours have been reviewed.    Significant Imaging: I have  reviewed all pertinent imaging results/findings within the past 24 hours.

## 2024-10-30 NOTE — PROGRESS NOTES
Ms Mariluz is comfortable.  The family is trying to decide on placement.    Splint c/d/I  Nv ok  Mildly stiff    Pop #2    Mobilize with therapy.  She is nwb through left hand and wrist.  She can be wbat through left elbow.  May try platform walker.  F/U 10 to 14 days.  Keep splint clean and dry.  Encouraged elevation and ROM left hand.

## 2024-10-30 NOTE — PT/OT/SLP PROGRESS
Physical Therapy Treatment    Patient Name:  Jayme Mcgraw   MRN:  [id number]    Recommendations:     Discharge Recommendations: Moderate Intensity Therapy  Discharge Equipment Recommendations: walker, [place]  Barriers to discharge: Decreased caregiver support    Assessment:     Jayme Mcgraw is a 89 y.o. female admitted with a medical diagnosis of Fracture of left radius.  She presents with the following impairments/functional limitations: weakness, impaired self care skills, impaired functional mobility, gait instability, decreased upper extremity function, orthopedic precautions Pt demonstrated improved mobility today but needed use of hemiwalker for stability. Because of need of hemiwalker in right hand and left UE fracture, pt is unable to adequately care for herself at home. Discussed with pt the benefits of swingbed.    Rehab Prognosis: Good; patient would benefit from acute skilled PT services to address these deficits and reach maximum level of function.    Recent Surgery: Procedure(s) (LRB):  ORIF, FRACTURE, RADIUS (Left)  DEBRIDEMENT, SKIN, FASCIA, MUSCLE, AND BONE, OPEN FRACTURE SITE (Left) 2 Days Post-Op    Plan:     During this hospitalization, patient to be seen 5 x/week to address the identified rehab impairments via gait training, therapeutic activities, therapeutic exercises and progress toward the following goals:    Plan of Care Expires:  11/30/24    Subjective     Chief Complaint: left UE fracture  Patient/Family Comments/goals: Pt is agreeable to PT   Pain/Comfort:  Pain Rating 1: 0/10  Pain Rating Post-Intervention 1: 0/10      Objective:     Communicated with AIYANA Sheikh RN prior to session.  Patient found HOB elevated with oxygen, peripheral IV upon PT entry to room.     General Precautions: Standard, fall  Orthopedic Precautions: LUE non weight bearing  Braces: UE Sling  Respiratory Status: Nasal cannula, flow 2 L/min     Functional Mobility:  Bed Mobility:     Scooting: contact guard  assistance  Supine to Sit: contact guard assistance  Transfers:     Sit to Stand:  contact guard assistance with hemiwalker  Bed to Chair: contact guard assistance with  hemiwalker  using  Step Transfer  Gait: 75 ft contact guard assistance with roderick walker, 50 ft contact guard assistance with quad cane, slow dinesh, flexed posture, short step length  Balance: fair      AM-PAC 6 CLICK MOBILITY  Turning over in bed (including adjusting bedclothes, sheets and blankets)?: 3  Sitting down on and standing up from a chair with arms (e.g., wheelchair, bedside commode, etc.): 4  Moving from lying on back to sitting on the side of the bed?: 3  Moving to and from a bed to a chair (including a wheelchair)?: 3  Need to walk in hospital room?: 3  Climbing 3-5 steps with a railing?: 2  Basic Mobility Total Score: 18       Treatment & Education:  Pt performed bilateral LE: bed level exercises: ankle pumps and seated exercises: long arc quads and marches x 30 each  Ambulation as noted above    Patient left up in chair with all lines intact and call button in reach..    GOALS:   Multidisciplinary Problems       Physical Therapy Goals          Problem: Physical Therapy    Goal Priority Disciplines Outcome Interventions   Physical Therapy Goal     PT, PT/OT Progressing    Description: Short Term Goals to be met by: 24    Patient will increase functional independence with mobility by performin. Supine to sit with Stand by assist  2. Sit to stand transfer with contact guard assist using [place]-walker  3. Bed to chair transfer with contact guard assist using Roderick-walker  4. Gait  x 100 feet with contact guard assist using lowest level of assistive device  5. Lower extremity exercise program x30 reps per handout, with assistance as needed    Long Term Goals to be met by: 24    Pt will regain full independent functional mobility with lowest level of assistive device to return to home situation and prior activities of  daily living.                        Time Tracking:     PT Received On: 10/30/24  PT Start Time: 0905     PT Stop Time: 0950  PT Total Time (min): 45 min     Billable Minutes: Gait Training 20 and Therapeutic Exercise 10    Treatment Type: Treatment  PT/PTA: PT     Number of PTA visits since last PT visit: 0     10/30/2024

## 2024-10-30 NOTE — ASSESSMENT & PLAN NOTE
Post-op #2 and doing well. Current recommendations for SWB as she requires right hemiwalker for ambulation and is NWB for the LUE. Case management assisting with disposition.  ---  Xray shows distal radius fracture, Orthopedics consulted, will hold patient asa and plavix, last dose of plavix was on Saturday evening.     RCRI score 3 15% 30 day risk of death MI or cardiac arrest, will get BNP in morning, if greater than 300 will need EKG after surgery and troponins trended daily for 24-48 hours after surgery. EKG unremarkable. No obvious contraindications to surgery or anesthesia.

## 2024-10-31 LAB
GLUCOSE SERPL-MCNC: 147 MG/DL (ref 70–105)
GLUCOSE SERPL-MCNC: 170 MG/DL (ref 70–105)
GLUCOSE SERPL-MCNC: 242 MG/DL (ref 70–105)

## 2024-10-31 PROCEDURE — 82803 BLOOD GASES ANY COMBINATION: CPT

## 2024-10-31 PROCEDURE — 63600175 PHARM REV CODE 636 W HCPCS

## 2024-10-31 PROCEDURE — 97110 THERAPEUTIC EXERCISES: CPT

## 2024-10-31 PROCEDURE — 99232 SBSQ HOSP IP/OBS MODERATE 35: CPT | Mod: ,,, | Performed by: FAMILY MEDICINE

## 2024-10-31 PROCEDURE — 99900035 HC TECH TIME PER 15 MIN (STAT)

## 2024-10-31 PROCEDURE — 82962 GLUCOSE BLOOD TEST: CPT

## 2024-10-31 PROCEDURE — 11000001 HC ACUTE MED/SURG PRIVATE ROOM

## 2024-10-31 PROCEDURE — 97535 SELF CARE MNGMENT TRAINING: CPT

## 2024-10-31 PROCEDURE — 25000003 PHARM REV CODE 250

## 2024-10-31 PROCEDURE — 97116 GAIT TRAINING THERAPY: CPT

## 2024-10-31 PROCEDURE — 25000003 PHARM REV CODE 250: Performed by: FAMILY MEDICINE

## 2024-10-31 PROCEDURE — 94761 N-INVAS EAR/PLS OXIMETRY MLT: CPT

## 2024-10-31 PROCEDURE — 27000221 HC OXYGEN, UP TO 24 HOURS

## 2024-10-31 PROCEDURE — 63600175 PHARM REV CODE 636 W HCPCS: Performed by: FAMILY MEDICINE

## 2024-10-31 RX ORDER — CLOPIDOGREL BISULFATE 75 MG/1
75 TABLET ORAL DAILY
Status: DISCONTINUED | OUTPATIENT
Start: 2024-10-31 | End: 2024-11-01 | Stop reason: HOSPADM

## 2024-10-31 RX ORDER — ASPIRIN 81 MG/1
81 TABLET ORAL DAILY
Status: DISCONTINUED | OUTPATIENT
Start: 2024-10-31 | End: 2024-11-01 | Stop reason: HOSPADM

## 2024-10-31 RX ADMIN — CARVEDILOL 12.5 MG: 12.5 TABLET, FILM COATED ORAL at 08:10

## 2024-10-31 RX ADMIN — ATORVASTATIN CALCIUM 40 MG: 40 TABLET, FILM COATED ORAL at 08:10

## 2024-10-31 RX ADMIN — CARVEDILOL 12.5 MG: 12.5 TABLET, FILM COATED ORAL at 09:10

## 2024-10-31 RX ADMIN — INSULIN HUMAN 10 UNITS: 100 INJECTION, SUSPENSION SUBCUTANEOUS at 04:10

## 2024-10-31 RX ADMIN — CLOPIDOGREL BISULFATE 75 MG: 75 TABLET ORAL at 04:10

## 2024-10-31 RX ADMIN — FUROSEMIDE 20 MG: 20 TABLET ORAL at 09:10

## 2024-10-31 RX ADMIN — INSULIN ASPART 2 UNITS: 100 INJECTION, SOLUTION INTRAVENOUS; SUBCUTANEOUS at 12:10

## 2024-10-31 RX ADMIN — ASPIRIN 81 MG: 81 TABLET, COATED ORAL at 04:10

## 2024-10-31 RX ADMIN — HUMAN INSULIN 8 UNITS: 100 INJECTION, SUSPENSION SUBCUTANEOUS at 09:10

## 2024-10-31 NOTE — PT/OT/SLP PROGRESS
Physical Therapy Treatment    Patient Name:  Jayme Mcgraw   MRN:  [id number]    Recommendations:     Discharge Recommendations: Moderate Intensity Therapy  Discharge Equipment Recommendations: walker, [place]  Barriers to discharge: Inaccessible home and Decreased caregiver support    Assessment:     Jayme Mcgraw is a 89 y.o. female admitted with a medical diagnosis of Fracture of left radius.  She presents with the following impairments/functional limitations: weakness, impaired self care skills, impaired functional mobility, gait instability, decreased upper extremity function, orthopedic precautions Pt is improving with mobility. Pt now more open to therapy at d/c. She demonstrated good use of platform walker as it better supports LUE..    Rehab Prognosis: Good; patient would benefit from acute skilled PT services to address these deficits and reach maximum level of function.    Recent Surgery: Procedure(s) (LRB):  ORIF, FRACTURE, RADIUS (Left)  DEBRIDEMENT, SKIN, FASCIA, MUSCLE, AND BONE, OPEN FRACTURE SITE (Left) 3 Days Post-Op    Plan:     During this hospitalization, patient to be seen 5 x/week to address the identified rehab impairments via gait training, therapeutic activities, therapeutic exercises and progress toward the following goals:    Plan of Care Expires:  11/30/24    Subjective     Chief Complaint: left UE fracture  Patient/Family Comments/goals: Pt wants to go to [facility]   Pain/Comfort:  Pain Rating 1: 0/10  Pain Rating Post-Intervention 1: 0/10      Objective:     Communicated with SRAVAN Bravo RN prior to session.  Patient found up in chair with peripheral IV upon PT entry to room.     General Precautions: Standard, fall  Orthopedic Precautions: LUE non weight bearing  Braces: UE Sling  Respiratory Status: Room air     Functional Mobility:  Transfers:     Sit to Stand:  contact guard assistance with platform walker  Gait: 80 ft x 2 trials, stand-by assistance with left platform walker, slow  dinesh  Balance: good      AM-PAC 6 CLICK MOBILITY  Turning over in bed (including adjusting bedclothes, sheets and blankets)?: 4  Sitting down on and standing up from a chair with arms (e.g., wheelchair, bedside commode, etc.): 4  Moving from lying on back to sitting on the side of the bed?: 3  Moving to and from a bed to a chair (including a wheelchair)?: 3  Need to walk in hospital room?: 3  Climbing 3-5 steps with a railing?: 3  Basic Mobility Total Score: 20       Treatment & Education:  Pt performed bilateral LE: seated exercises: ankle pumps, long arc quads, and marches x 30 each        Patient left up in chair with all lines intact and call button in reach..    GOALS:   Multidisciplinary Problems       Physical Therapy Goals          Problem: Physical Therapy    Goal Priority Disciplines Outcome Interventions   Physical Therapy Goal     PT, PT/OT Progressing    Description: Short Term Goals to be met by: 24    Patient will increase functional independence with mobility by performin. Supine to sit with Stand by assist  2. Sit to stand transfer with contact guard assist using Roderick-walker  3. Bed to chair transfer with contact guard assist using Roderick-walker  4. Gait  x 100 feet with contact guard assist using lowest level of assistive device  5. Lower extremity exercise program x30 reps per handout, with assistance as needed    Long Term Goals to be met by: 24    Pt will regain full independent functional mobility with lowest level of assistive device to return to home situation and prior activities of daily living.                        Time Tracking:     PT Received On: 10/31/24  PT Start Time: 917     PT Stop Time: 948  PT Total Time (min): 31 min     Billable Minutes: Gait Training 15 and Therapeutic Exercise 15    Treatment Type: Treatment  PT/PTA: PT     Number of PTA visits since last PT visit: 0     10/31/2024

## 2024-10-31 NOTE — PLAN OF CARE
Problem: Wound  Goal: Optimal Pain Control and Function  Outcome: Progressing  Intervention: Prevent or Manage Pain  Flowsheets (Taken 10/31/2024 1627)  Sleep/Rest Enhancement:   relaxation techniques promoted   regular sleep/rest pattern promoted  Pain Management Interventions:   pillow support provided   quiet environment facilitated     Problem: Adult Inpatient Plan of Care  Goal: Optimal Comfort and Wellbeing  Outcome: Progressing

## 2024-10-31 NOTE — ASSESSMENT & PLAN NOTE
Patients blood pressure range in the last 24 hours was:  Vitals:    10/30/24 1536 10/30/24 1913 10/30/24 2346 10/31/24 0414   BP: (!) 109/57 126/65 117/68 131/69    10/31/24 0718 10/31/24 1104   BP: 112/62 138/70       The patient's inpatient anti-hypertensive regimen is listed below:    Current Antihypertensives  carvediloL tablet 12.5 mg, 2 times daily, Oral  furosemide tablet 20 mg, Daily, Oral    Plan  - BP is controlled, no changes needed to their regimen

## 2024-10-31 NOTE — PT/OT/SLP PROGRESS
Occupational Therapy   Treatment    Name: Jayme Mcgraw  MRN: [id number]  Admitting Diagnosis:  Fracture of left radius  3 Days Post-Op    Recommendations:     Discharge Recommendations: Moderate Intensity Therapy  Discharge Equipment Recommendations:  walker, vriginia  Barriers to discharge:  None    Assessment:     Jayme Mcgraw is a 89 y.o. female with a medical diagnosis of Fracture of left radius.  She presents with sleeping, but awakened easily and was agreeable to treatment. Performance deficits affecting function are weakness, impaired endurance, impaired self care skills, orthopedic precautions.     Rehab Prognosis:  Good; patient would benefit from acute skilled OT services to address these deficits and reach maximum level of function.       Plan:     Patient to be seen 5 x/week to address the above listed problems via self-care/home management, therapeutic activities, therapeutic exercises  Plan of Care Expires:    Plan of Care Reviewed with: patient, family    Subjective     Chief Complaint: Fracture of left radius    Patient/Family Comments/goals: Pt is awaiting swingbed approval  Pain/Comfort:  Pain Rating 1: 4/10  Location - Side 1: Left  Location 1: arm  Pain Addressed 1: Distraction, Reposition, Cessation of Activity  Pain Rating Post-Intervention 1: 4/10    Objective:     Communicated with: MAKAYLA Mae prior to session.  Patient found supine with peripheral IV upon OT entry to room.    General Precautions: Standard, fall    Orthopedic Precautions:LUE non weight bearing  Braces: UE Sling  Respiratory Status: Room air     Occupational Performance:     Bed Mobility:    Patient completed Supine to Sit with stand by assistance  Patient completed Sit to Supine with contact guard assistance     Functional Mobility/Transfers:  Patient completed Sit <> Stand Transfer with stand by assistance  with  hemiwalker   Patient completed Bed <> Chair Transfer using Step Transfer technique with stand by assistance  with hemiwalker  Patient completed Toilet Transfer Step Transfer technique with stand by assistance and contact guard assistance with  hemiwalker  Functional Mobility: Pt demonstrated good use of hemiwalker for balance with ambulaing in room.     Activities of Daily Living:  Grooming: washed (R) hand at the sink with SBA    Lower Body Dressing: doffed and donned socks with SBA and cues using one handed technique.     Toileting: stand by assistance        [facility] 6 Click ADL: 19    Treatment & Education:  Pt performed mobility and ADL as above.  Pt performed (R) UE ex for:  Elbow flexion with 2# db x 15 reps  Punches with 2# db x 15 reps  Shoulder flexion with 1# db x 15 reps  Shoulder horizontal abd/ adduction with 1# db x 10 reps  Handhelper with 1 band of heavy resistance x 20 reps    Pt performed (B) shoulder rolls x 7 reps, attempted (B) scapula protraction/retracion, but had a very difficulty time coordinating that movement.    Pt performed (L) shoulder flexion AROM to tolerance for 15 reps.  (L) finger flexion/extension for 15 reps.    Pt participated well with all activity. Pt was educated to perform AROM with Left shoulder and fingers.    Patient left supine with call button in reach and family present    GOALS:   Multidisciplinary Problems       Occupational Therapy Goals          Problem: Occupational Therapy    Goal Priority Disciplines Outcome Interventions   Occupational Therapy Goal     OT, PT/OT Progressing    Description: STG:  Pt will perform grooming with setup  Pt will bathe with Min A  Pt will perform UE dressing with Mod I  Pt will perform LE dressing with Min A  Pt will sit EOB x 15 min with no assistance  Pt will transfer bed/chair/bsc with Mod I  Pt will perform standing task x 10 min with supervision assistance  Pt will tolerate 30 minutes of tx without fatigue      LT.Restore to max I with self care and mobility.                         Time Tracking:     OT Date of Treatment:  10/31/24  OT Start Time: 1536  OT Stop Time: 1601  OT Total Time (min): 25 min    Billable Minutes:Self Care/Home Management 10min  Therapeutic Exercise 15min    OT/SHEN: OT          10/31/2024

## 2024-10-31 NOTE — PROGRESS NOTES
[facility] - [address] [place] [address] Medicine  Progress Note    Patient Name: Jayme Mcgraw  MRN: [id number]  Patient Class: IP- Inpatient   Admission Date: 10/27/2024  Length of Stay: 4 days  Attending Physician: Cara Ramirez DO  Primary Care Provider: Erin, Primary Doctor        Subjective:     Principal Problem:Fracture of left radius        HPI:  Patient is a 89 yo F who presents to [facility] after a fall. Patient reports she tripped over her great grandchild and fell on her outstretched left hand. Patient has a PMHx of HTN, CHF, CAD with CABG.      Initial presenting vitals in the ED HR 57, /80, Temp 97.6, Spo2 88% on RA. Initial presenting labs WBC 6.15, H/H 11.7/36.7, Platelet 139, PT 4.1, INR 1.10, Ptt 31.3, Na 136, K 4.6, Cl 102, Co2 32, Glu 300, Ca 8.8, BUN/Cr 17/1.08 CT head and neck negative for fractures.CXR cardiomegaly Xray wrist  complex fracture of distal radius and ulna. Orthopedic surgery consulted, start on ancef.      This patient will be admitted to [facility] for continued medical management.      Overview/Hospital Course:  No notes on file    Interval History:     No significant events overnight, no new complaints or concerns. Patient and family now agreeable to SWB. Case management assisting with placement.     Review of Systems   Constitutional:  Positive for fatigue. Negative for fever.   Respiratory:  Negative for cough and shortness of breath.    Cardiovascular:  Negative for chest pain.   Gastrointestinal:  Negative for abdominal pain, constipation, diarrhea, nausea and vomiting.   Musculoskeletal:  Positive for arthralgias. Negative for joint swelling.   Neurological:  Negative for weakness and headaches.     Objective:     Vital Signs (Most Recent):  Temp: 98.2 °F (36.8 °C) (10/31/24 1104)  Pulse: 63 (10/31/24 1104)  Resp: 18 (10/31/24 1104)  BP: 138/70 (10/31/24 1104)  SpO2: 98 % (10/31/24 1104) Vital Signs (24h  Range):  Temp:  [98.2 °F (36.8 °C)-99 °F (37.2 °C)] 98.2 °F (36.8 °C)  Pulse:  [63-77] 63  Resp:  [14-18] 18  SpO2:  [98 %-99 %] 98 %  BP: (109-138)/(57-70) 138/70     Weight: 65.9 kg (145 lb 4.5 oz)  Body mass index is 27.45 kg/m².    Intake/Output Summary (Last 24 hours) at 10/31/2024 1340  Last data filed at 10/30/2024 2134  Gross per 24 hour   Intake 360 ml   Output --   Net 360 ml           Physical Exam  Vitals and nursing note reviewed.   Eyes:      General: No scleral icterus.     Conjunctiva/sclera: Conjunctivae normal.   Cardiovascular:      Rate and Rhythm: Normal rate and regular rhythm.   Pulmonary:      Effort: Pulmonary effort is normal. No respiratory distress.   Abdominal:      General: Abdomen is flat.      Palpations: Abdomen is soft.      Tenderness: There is no abdominal tenderness.   Musculoskeletal:      Right forearm: Normal.      Right lower leg: Edema present.      Left lower leg: Edema present.   Skin:     General: Skin is warm.   Neurological:      Mental Status: She is alert. Mental status is at baseline.             Significant Labs: All pertinent labs within the past 24 hours have been reviewed.    Significant Imaging: I have reviewed all pertinent imaging results/findings within the past 24 hours.    Assessment/Plan:      HTN (hypertension)  Patients blood pressure range in the last 24 hours was:  Vitals:    10/30/24 1536 10/30/24 1913 10/30/24 2346 10/31/24 0414   BP: (!) 109/57 126/65 117/68 131/69    10/31/24 0718 10/31/24 1104   BP: 112/62 138/70       The patient's inpatient anti-hypertensive regimen is listed below:    Current Antihypertensives  carvediloL tablet 12.5 mg, 2 times daily, Oral  furosemide tablet 20 mg, Daily, Oral    Plan  - BP is controlled, no changes needed to their regimen      Coronary artery disease involving coronary bypass graft  Patient with known CAD s/p CABG, which is controlled Will continue ASA, Plavix, and Statin and monitor for S/Sx of angina/ACS.  "Continue to monitor on telemetry.     Type 2 diabetes mellitus, without long-term current use of insulin  Patient's FSGs are controlled on current medication regimen.     Last A1c reviewed-   Lab Results   Component Value Date    HGBA1C 7.7 (H) 10/28/2024     Most recent fingerstick glucose reviewed- No results for input(s): "POCTGLUCOSE" in the last 24 hours.  Current correctional scale  Low  Increase anti-hyperglycemic dose as follows-   Antihyperglycemics (From admission, onward)      Start     Stop Route Frequency Ordered    10/31/24 1615  insulin NPH-insulin regular (70/30) injection 10 Units         -- SubQ 2 times daily before meals 10/31/24 1340    10/27/24 2039  insulin aspart U-100 injection 0-5 Units         -- SubQ Before meals & nightly PRN 10/27/24 1943          Hold Oral hypoglycemics while patient is in the hospital.          VTE Risk Mitigation (From admission, onward)           Ordered     IP VTE HIGH RISK PATIENT  Once         10/27/24 1943     Place sequential compression device  Until discontinued         10/27/24 1943                    Discharge Planning   DEON:      Code Status: Full Code   Is the patient medically ready for discharge?:     Reason for patient still in hospital (select all that apply): Treatment  Discharge Plan A: Home with family, Home Health                  Cara Ramirez DO  Department of Hospital Medicine   [facility] Tele[facility]    "

## 2024-10-31 NOTE — ASSESSMENT & PLAN NOTE
"Patient's FSGs are controlled on current medication regimen.     Last A1c reviewed-   Lab Results   Component Value Date    HGBA1C 7.7 (H) 10/28/2024     Most recent fingerstick glucose reviewed- No results for input(s): "POCTGLUCOSE" in the last 24 hours.  Current correctional scale  Low  Increase anti-hyperglycemic dose as follows-   Antihyperglycemics (From admission, onward)      Start     Stop Route Frequency Ordered    10/31/24 1615  insulin NPH-insulin regular (70/30) injection 10 Units         -- SubQ 2 times daily before meals 10/31/24 1340    10/27/24 2039  insulin aspart U-100 injection 0-5 Units         -- SubQ Before meals & nightly PRN 10/27/24 1943          Hold Oral hypoglycemics while patient is in the hospital.      "

## 2024-10-31 NOTE — PLAN OF CARE
Patient and family have agreed to SWB.  Choice obtained for [facility].  Referral faxed and sent Kianna a secure chat.  CM will continue to follow.

## 2024-10-31 NOTE — SUBJECTIVE & OBJECTIVE
Interval History:     No significant events overnight, no new complaints or concerns. Patient and family now agreeable to SWB. Case management assisting with placement.     Review of Systems   Constitutional:  Positive for fatigue. Negative for fever.   Respiratory:  Negative for cough and shortness of breath.    Cardiovascular:  Negative for chest pain.   Gastrointestinal:  Negative for abdominal pain, constipation, diarrhea, nausea and vomiting.   Musculoskeletal:  Positive for arthralgias. Negative for joint swelling.   Neurological:  Negative for weakness and headaches.     Objective:     Vital Signs (Most Recent):  Temp: 98.2 °F (36.8 °C) (10/31/24 1104)  Pulse: 63 (10/31/24 1104)  Resp: 18 (10/31/24 1104)  BP: 138/70 (10/31/24 1104)  SpO2: 98 % (10/31/24 1104) Vital Signs (24h Range):  Temp:  [98.2 °F (36.8 °C)-99 °F (37.2 °C)] 98.2 °F (36.8 °C)  Pulse:  [63-77] 63  Resp:  [14-18] 18  SpO2:  [98 %-99 %] 98 %  BP: (109-138)/(57-70) 138/70     Weight: 65.9 kg (145 lb 4.5 oz)  Body mass index is 27.45 kg/m².    Intake/Output Summary (Last 24 hours) at 10/31/2024 1340  Last data filed at 10/30/2024 2134  Gross per 24 hour   Intake 360 ml   Output --   Net 360 ml           Physical Exam  Vitals and nursing note reviewed.   Eyes:      General: No scleral icterus.     Conjunctiva/sclera: Conjunctivae normal.   Cardiovascular:      Rate and Rhythm: Normal rate and regular rhythm.   Pulmonary:      Effort: Pulmonary effort is normal. No respiratory distress.   Abdominal:      General: Abdomen is flat.      Palpations: Abdomen is soft.      Tenderness: There is no abdominal tenderness.   Musculoskeletal:      Right forearm: Normal.      Right lower leg: Edema present.      Left lower leg: Edema present.   Skin:     General: Skin is warm.   Neurological:      Mental Status: She is alert. Mental status is at baseline.             Significant Labs: All pertinent labs within the past 24 hours have been  reviewed.    Significant Imaging: I have reviewed all pertinent imaging results/findings within the past 24 hours.

## 2024-11-01 ENCOUNTER — HOSPITAL ENCOUNTER (INPATIENT)
Facility: HOSPITAL | Age: 89
LOS: 7 days | Discharge: HOME | DRG: 561 | End: 2024-11-08
Attending: FAMILY MEDICINE | Admitting: FAMILY MEDICINE
Payer: MEDICARE

## 2024-11-01 VITALS
BODY MASS INDEX: 27.43 KG/M2 | HEART RATE: 58 BPM | RESPIRATION RATE: 18 BRPM | HEIGHT: 61 IN | SYSTOLIC BLOOD PRESSURE: 130 MMHG | OXYGEN SATURATION: 96 % | WEIGHT: 145.31 LBS | TEMPERATURE: 98 F | DIASTOLIC BLOOD PRESSURE: 64 MMHG

## 2024-11-01 DIAGNOSIS — S52.502A FRACTURE OF DISTAL END OF LEFT RADIUS AND ULNA, CLOSED, INITIAL ENCOUNTER: ICD-10-CM

## 2024-11-01 DIAGNOSIS — R07.9 CHEST PAIN: ICD-10-CM

## 2024-11-01 DIAGNOSIS — S52.602A FRACTURE OF DISTAL END OF LEFT RADIUS AND ULNA, CLOSED, INITIAL ENCOUNTER: ICD-10-CM

## 2024-11-01 DIAGNOSIS — I50.9 CHF (CONGESTIVE HEART FAILURE): ICD-10-CM

## 2024-11-01 DIAGNOSIS — E11.9 TYPE 2 DIABETES MELLITUS WITHOUT COMPLICATION, WITHOUT LONG-TERM CURRENT USE OF INSULIN: Primary | ICD-10-CM

## 2024-11-01 LAB
ALBUMIN SERPL BCP-MCNC: 2.5 G/DL (ref 3.5–5)
ALBUMIN/GLOB SERPL: 0.7 {RATIO}
ALP SERPL-CCNC: 73 U/L (ref 55–142)
ALT SERPL W P-5'-P-CCNC: 16 U/L (ref 13–56)
ANION GAP SERPL CALCULATED.3IONS-SCNC: 6 MMOL/L (ref 7–16)
AST SERPL W P-5'-P-CCNC: 32 U/L (ref 15–37)
BACTERIA #/AREA URNS HPF: ABNORMAL /HPF
BASOPHILS # BLD AUTO: 0.01 K/UL (ref 0–0.2)
BASOPHILS NFR BLD AUTO: 0.2 % (ref 0–1)
BILIRUB SERPL-MCNC: 0.6 MG/DL (ref ?–1.2)
BILIRUB UR QL STRIP: NEGATIVE
BUN SERPL-MCNC: 13 MG/DL (ref 7–18)
BUN/CREAT SERPL: 10 (ref 6–20)
CALCIUM SERPL-MCNC: 8.6 MG/DL (ref 8.5–10.1)
CHLORIDE SERPL-SCNC: 100 MMOL/L (ref 98–107)
CLARITY UR: CLEAR
CO2 SERPL-SCNC: 34 MMOL/L (ref 21–32)
COLOR UR: YELLOW
CREAT SERPL-MCNC: 1.33 MG/DL (ref 0.55–1.02)
DIFFERENTIAL METHOD BLD: ABNORMAL
EGFR (NO RACE VARIABLE) (RUSH/TITUS): 37 ML/MIN/1.73M2
EOSINOPHIL # BLD AUTO: 0.01 K/UL (ref 0–0.5)
EOSINOPHIL NFR BLD AUTO: 0.2 % (ref 1–4)
ERYTHROCYTE [DISTWIDTH] IN BLOOD BY AUTOMATED COUNT: 12.6 % (ref 11.5–14.5)
GLOBULIN SER-MCNC: 3.7 G/DL (ref 2–4)
GLUCOSE SERPL-MCNC: 134 MG/DL (ref 70–105)
GLUCOSE SERPL-MCNC: 177 MG/DL (ref 70–105)
GLUCOSE SERPL-MCNC: 217 MG/DL (ref 74–106)
GLUCOSE SERPL-MCNC: 257 MG/DL (ref 70–105)
GLUCOSE SERPL-MCNC: 309 MG/DL (ref 70–105)
GLUCOSE UR STRIP-MCNC: NEGATIVE MG/DL
HCT VFR BLD AUTO: 31.3 % (ref 38–47)
HGB BLD-MCNC: 10 G/DL (ref 12–16)
KETONES UR STRIP-SCNC: NEGATIVE MG/DL
LEUKOCYTE ESTERASE UR QL STRIP: NEGATIVE
LYMPHOCYTES # BLD AUTO: 0.79 K/UL (ref 1–4.8)
LYMPHOCYTES NFR BLD AUTO: 15.3 % (ref 27–41)
MAGNESIUM SERPL-MCNC: 1.9 MG/DL (ref 1.7–2.3)
MCH RBC QN AUTO: 29.1 PG (ref 27–31)
MCHC RBC AUTO-ENTMCNC: 31.9 G/DL (ref 32–36)
MCV RBC AUTO: 91 FL (ref 80–96)
MONOCYTES # BLD AUTO: 0.38 K/UL (ref 0–0.8)
MONOCYTES NFR BLD AUTO: 7.3 % (ref 2–6)
MPC BLD CALC-MCNC: 9.5 FL (ref 9.4–12.4)
NEUTROPHILS # BLD AUTO: 3.99 K/UL (ref 1.8–7.7)
NEUTROPHILS NFR BLD AUTO: 77 % (ref 53–65)
NITRITE UR QL STRIP: NEGATIVE
PH UR STRIP: 7 PH UNITS
PHOSPHATE SERPL-MCNC: 2.5 MG/DL (ref 2.5–4.5)
PLATELET # BLD AUTO: 158 K/UL (ref 150–400)
POTASSIUM SERPL-SCNC: 3.3 MMOL/L (ref 3.5–5.1)
PROT SERPL-MCNC: 6.2 G/DL (ref 6.4–8.2)
PROT UR QL STRIP: ABNORMAL
RBC # BLD AUTO: 3.44 M/UL (ref 4.2–5.4)
RBC # UR STRIP: ABNORMAL /UL
RBC #/AREA URNS HPF: ABNORMAL /HPF
SODIUM SERPL-SCNC: 137 MMOL/L (ref 136–145)
SP GR UR STRIP: 1.01
SQUAMOUS #/AREA URNS LPF: ABNORMAL /LPF
UROBILINOGEN UR STRIP-ACNC: 1 MG/DL
WBC # BLD AUTO: 5.18 K/UL (ref 4.5–11)
WBC #/AREA URNS HPF: ABNORMAL /HPF

## 2024-11-01 PROCEDURE — 99900035 HC TECH TIME PER 15 MIN (STAT)

## 2024-11-01 PROCEDURE — 25000003 PHARM REV CODE 250: Performed by: NURSE PRACTITIONER

## 2024-11-01 PROCEDURE — 93005 ELECTROCARDIOGRAM TRACING: CPT

## 2024-11-01 PROCEDURE — 25000003 PHARM REV CODE 250: Performed by: FAMILY MEDICINE

## 2024-11-01 PROCEDURE — 93010 ELECTROCARDIOGRAM REPORT: CPT | Mod: ,,, | Performed by: INTERNAL MEDICINE

## 2024-11-01 PROCEDURE — 83735 ASSAY OF MAGNESIUM: CPT | Performed by: NURSE PRACTITIONER

## 2024-11-01 PROCEDURE — 63600175 PHARM REV CODE 636 W HCPCS: Performed by: FAMILY MEDICINE

## 2024-11-01 PROCEDURE — 11000004 HC SNF PRIVATE

## 2024-11-01 PROCEDURE — 36415 COLL VENOUS BLD VENIPUNCTURE: CPT | Performed by: NURSE PRACTITIONER

## 2024-11-01 PROCEDURE — 81003 URINALYSIS AUTO W/O SCOPE: CPT | Performed by: NURSE PRACTITIONER

## 2024-11-01 PROCEDURE — 97116 GAIT TRAINING THERAPY: CPT

## 2024-11-01 PROCEDURE — 63600175 PHARM REV CODE 636 W HCPCS

## 2024-11-01 PROCEDURE — A6212 FOAM DRG <=16 SQ IN W/BORDER: HCPCS

## 2024-11-01 PROCEDURE — 63600175 PHARM REV CODE 636 W HCPCS: Performed by: NURSE PRACTITIONER

## 2024-11-01 PROCEDURE — 80053 COMPREHEN METABOLIC PANEL: CPT | Performed by: NURSE PRACTITIONER

## 2024-11-01 PROCEDURE — 85025 COMPLETE CBC W/AUTO DIFF WBC: CPT | Performed by: NURSE PRACTITIONER

## 2024-11-01 PROCEDURE — 97110 THERAPEUTIC EXERCISES: CPT

## 2024-11-01 PROCEDURE — 94761 N-INVAS EAR/PLS OXIMETRY MLT: CPT

## 2024-11-01 PROCEDURE — 82962 GLUCOSE BLOOD TEST: CPT

## 2024-11-01 PROCEDURE — 94799 UNLISTED PULMONARY SVC/PX: CPT

## 2024-11-01 PROCEDURE — 84100 ASSAY OF PHOSPHORUS: CPT | Performed by: NURSE PRACTITIONER

## 2024-11-01 PROCEDURE — 25000003 PHARM REV CODE 250

## 2024-11-01 PROCEDURE — 94640 AIRWAY INHALATION TREATMENT: CPT

## 2024-11-01 PROCEDURE — 99239 HOSP IP/OBS DSCHRG MGMT >30: CPT | Mod: ,,, | Performed by: FAMILY MEDICINE

## 2024-11-01 PROCEDURE — 27000492 HC SLEEVE, SCD T/L

## 2024-11-01 RX ORDER — NALOXONE HCL 0.4 MG/ML
0.02 VIAL (ML) INJECTION
Status: DISCONTINUED | OUTPATIENT
Start: 2024-11-01 | End: 2024-11-08 | Stop reason: HOSPADM

## 2024-11-01 RX ORDER — TALC
6 POWDER (GRAM) TOPICAL NIGHTLY PRN
Status: DISCONTINUED | OUTPATIENT
Start: 2024-11-01 | End: 2024-11-08 | Stop reason: HOSPADM

## 2024-11-01 RX ORDER — IBUPROFEN 200 MG
16 TABLET ORAL
Status: DISCONTINUED | OUTPATIENT
Start: 2024-11-01 | End: 2024-11-08 | Stop reason: HOSPADM

## 2024-11-01 RX ORDER — CALCIUM CARBONATE 200(500)MG
500 TABLET,CHEWABLE ORAL 2 TIMES DAILY PRN
Status: DISCONTINUED | OUTPATIENT
Start: 2024-11-01 | End: 2024-11-08 | Stop reason: HOSPADM

## 2024-11-01 RX ORDER — CARVEDILOL 6.25 MG/1
12.5 TABLET ORAL 2 TIMES DAILY
Status: DISCONTINUED | OUTPATIENT
Start: 2024-11-01 | End: 2024-11-08 | Stop reason: HOSPADM

## 2024-11-01 RX ORDER — DIPHENHYDRAMINE HYDROCHLORIDE 50 MG/ML
25 INJECTION INTRAMUSCULAR; INTRAVENOUS EVERY 6 HOURS PRN
Status: DISCONTINUED | OUTPATIENT
Start: 2024-11-01 | End: 2024-11-08 | Stop reason: HOSPADM

## 2024-11-01 RX ORDER — ONDANSETRON HYDROCHLORIDE 2 MG/ML
4 INJECTION, SOLUTION INTRAVENOUS EVERY 8 HOURS PRN
Status: DISCONTINUED | OUTPATIENT
Start: 2024-11-01 | End: 2024-11-08 | Stop reason: HOSPADM

## 2024-11-01 RX ORDER — AMOXICILLIN 250 MG
1 CAPSULE ORAL 2 TIMES DAILY
Status: DISCONTINUED | OUTPATIENT
Start: 2024-11-01 | End: 2024-11-08 | Stop reason: HOSPADM

## 2024-11-01 RX ORDER — MORPHINE SULFATE 4 MG/ML
2 INJECTION, SOLUTION INTRAMUSCULAR; INTRAVENOUS EVERY 4 HOURS PRN
Status: DISCONTINUED | OUTPATIENT
Start: 2024-11-01 | End: 2024-11-08 | Stop reason: HOSPADM

## 2024-11-01 RX ORDER — FUROSEMIDE 20 MG/1
20 TABLET ORAL DAILY
Status: DISCONTINUED | OUTPATIENT
Start: 2024-11-02 | End: 2024-11-08 | Stop reason: HOSPADM

## 2024-11-01 RX ORDER — MUPIROCIN 20 MG/G
OINTMENT TOPICAL 2 TIMES DAILY
Status: COMPLETED | OUTPATIENT
Start: 2024-11-01 | End: 2024-11-06

## 2024-11-01 RX ORDER — ACETAMINOPHEN 325 MG/1
650 TABLET ORAL EVERY 4 HOURS PRN
Status: DISCONTINUED | OUTPATIENT
Start: 2024-11-01 | End: 2024-11-08 | Stop reason: HOSPADM

## 2024-11-01 RX ORDER — INSULIN ASPART 100 [IU]/ML
0-5 INJECTION, SOLUTION INTRAVENOUS; SUBCUTANEOUS
Status: DISCONTINUED | OUTPATIENT
Start: 2024-11-01 | End: 2024-11-08 | Stop reason: HOSPADM

## 2024-11-01 RX ORDER — IBUPROFEN 200 MG
24 TABLET ORAL
Status: DISCONTINUED | OUTPATIENT
Start: 2024-11-01 | End: 2024-11-08 | Stop reason: HOSPADM

## 2024-11-01 RX ORDER — ONDANSETRON HYDROCHLORIDE 2 MG/ML
4 INJECTION, SOLUTION INTRAVENOUS DAILY PRN
Status: DISCONTINUED | OUTPATIENT
Start: 2024-11-01 | End: 2024-11-08 | Stop reason: HOSPADM

## 2024-11-01 RX ORDER — CLOPIDOGREL BISULFATE 75 MG/1
75 TABLET ORAL DAILY
Status: DISCONTINUED | OUTPATIENT
Start: 2024-11-02 | End: 2024-11-08 | Stop reason: HOSPADM

## 2024-11-01 RX ORDER — ATORVASTATIN CALCIUM 40 MG/1
40 TABLET, FILM COATED ORAL NIGHTLY
Status: DISCONTINUED | OUTPATIENT
Start: 2024-11-01 | End: 2024-11-08 | Stop reason: HOSPADM

## 2024-11-01 RX ORDER — GLUCAGON 1 MG
1 KIT INJECTION
Status: DISCONTINUED | OUTPATIENT
Start: 2024-11-01 | End: 2024-11-08 | Stop reason: HOSPADM

## 2024-11-01 RX ORDER — HYDROCODONE BITARTRATE AND ACETAMINOPHEN 7.5; 325 MG/1; MG/1
1 TABLET ORAL EVERY 6 HOURS PRN
Status: DISCONTINUED | OUTPATIENT
Start: 2024-11-01 | End: 2024-11-08 | Stop reason: HOSPADM

## 2024-11-01 RX ORDER — SODIUM CHLORIDE 0.9 % (FLUSH) 0.9 %
10 SYRINGE (ML) INJECTION EVERY 12 HOURS PRN
Status: DISCONTINUED | OUTPATIENT
Start: 2024-11-01 | End: 2024-11-08 | Stop reason: HOSPADM

## 2024-11-01 RX ORDER — ASPIRIN 81 MG/1
81 TABLET ORAL DAILY
Status: DISCONTINUED | OUTPATIENT
Start: 2024-11-02 | End: 2024-11-08 | Stop reason: HOSPADM

## 2024-11-01 RX ADMIN — CLOPIDOGREL BISULFATE 75 MG: 75 TABLET ORAL at 08:11

## 2024-11-01 RX ADMIN — SENNOSIDES AND DOCUSATE SODIUM 1 TABLET: 50; 8.6 TABLET ORAL at 08:11

## 2024-11-01 RX ADMIN — ASPIRIN 81 MG: 81 TABLET, COATED ORAL at 08:11

## 2024-11-01 RX ADMIN — INSULIN ASPART 3 UNITS: 100 INJECTION, SOLUTION INTRAVENOUS; SUBCUTANEOUS at 12:11

## 2024-11-01 RX ADMIN — CARVEDILOL 12.5 MG: 6.25 TABLET, FILM COATED ORAL at 08:11

## 2024-11-01 RX ADMIN — ATORVASTATIN CALCIUM 40 MG: 40 TABLET, FILM COATED ORAL at 08:11

## 2024-11-01 RX ADMIN — MUPIROCIN: 20 OINTMENT TOPICAL at 08:11

## 2024-11-01 RX ADMIN — INSULIN ASPART 2 UNITS: 100 INJECTION, SOLUTION INTRAVENOUS; SUBCUTANEOUS at 05:11

## 2024-11-01 RX ADMIN — CARVEDILOL 12.5 MG: 12.5 TABLET, FILM COATED ORAL at 08:11

## 2024-11-01 RX ADMIN — INSULIN HUMAN 10 UNITS: 100 INJECTION, SUSPENSION SUBCUTANEOUS at 05:11

## 2024-11-01 RX ADMIN — INSULIN ASPART 2 UNITS: 100 INJECTION, SOLUTION INTRAVENOUS; SUBCUTANEOUS at 09:11

## 2024-11-01 RX ADMIN — FUROSEMIDE 20 MG: 20 TABLET ORAL at 08:11

## 2024-11-01 NOTE — DISCHARGE SUMMARY
[facility] - [address] [place] [address] Medicine  Discharge Summary      Patient Name: Jayme Mcgraw  MRN: [id number]  MELISSA: [id number]  Patient Class: IP- Inpatient  Admission Date: 10/27/2024  Hospital Length of Stay: 5 days  Discharge Date and Time:  11/01/2024 10:30 AM  Attending Physician: Humaira Ramirez DO   Discharging Provider: Humaira Ramirez DO  Primary Care Provider: Erin, Primary Doctor    Primary Care Team: Networked reference to record PCT     HPI:   Patient is a 89 yo F who presents to [facility] after a fall. Patient reports she tripped over her great grandchild and fell on her outstretched left hand. Patient has a PMHx of HTN, CHF, CAD with CABG.      Initial presenting vitals in the ED HR 57, /80, Temp 97.6, Spo2 88% on RA. Initial presenting labs WBC 6.15, H/H 11.7/36.7, Platelet 139, PT 4.1, INR 1.10, Ptt 31.3, Na 136, K 4.6, Cl 102, Co2 32, Glu 300, Ca 8.8, BUN/Cr 17/1.08 CT head and neck negative for fractures.CXR cardiomegaly Xray wrist  complex fracture of distal radius and ulna. Orthopedic surgery consulted, start on ancef.      This patient will be admitted to [facility] for continued medical management.      Procedure(s) (LRB):  ORIF, FRACTURE, RADIUS (Left)  DEBRIDEMENT, SKIN, FASCIA, MUSCLE, AND BONE, OPEN FRACTURE SITE (Left)      Hospital Course:   No notes on file     Goals of Care Treatment Preferences:  Code Status: Full Code      SDOH Screening:  The patient was screened for utility difficulties, food insecurity, transport difficulties, housing insecurity, and interpersonal safety and there were no concerns identified this admission.     Consults:   Consults (From admission, onward)          Status Ordering Provider     Inpatient consult to Social Work  Once        Provider:  (Not yet assigned)    Completed HUMAIRA RAMIREZ     Inpatient consult to Orthopedics  Once        Provider:  (Not yet assigned)    Completed  RILEY HERNANDEZ            Cardiac/Vascular  HTN (hypertension)  Patients blood pressure range in the last 24 hours was:  Vitals:    10/31/24 1104 10/31/24 1556 10/31/24 1913 10/31/24 2045   BP: 138/70 119/66 106/61 106/61    10/31/24 2326 11/01/24 0352 11/01/24 0759   BP: 125/68 (!) 126/52 125/64       The patient's inpatient anti-hypertensive regimen is listed below:    Current Antihypertensives  carvediloL tablet 12.5 mg, 2 times daily, Oral  furosemide tablet 20 mg, Daily, Oral    Plan  - BP is controlled, no changes needed to their regimen      Coronary artery disease involving coronary bypass graft  Patient with known CAD s/p CABG, which is controlled Will continue ASA, Plavix, and Statin.    Endocrine  Type 2 diabetes mellitus, without long-term current use of insulin  Patient's FSGs are controlled on current medication regimen.     Last A1c reviewed-   Lab Results   Component Value Date    HGBA1C 7.7 (H) 10/28/2024     Continue medications per discharge medication reconciliation.      Orthopedic  * Fracture of left radius-resolved as of 10/30/2024  Status-post surgical intervention by Dr. Mckinley. Pain well controlled. Discharge to [facility] for continue PT/OT. Follow up with orthopedics in 10-14 days.   ---  Xray shows distal radius fracture, Orthopedics consulted, will hold patient asa and plavix, last dose of plavix was on Saturday evening.     RCRI score 3 15% 30 day risk of death MI or cardiac arrest, will get BNP in morning, if greater than 300 will need EKG after surgery and troponins trended daily for 24-48 hours after surgery. EKG unremarkable. No obvious contraindications to surgery or anesthesia.       Fracture of left ulna-resolved as of 10/30/2024  See plan above.        Final Active Diagnoses:    Diagnosis Date Noted POA    Type 2 diabetes mellitus, without long-term current use of insulin [E11.9] 10/27/2024 Yes    Coronary artery disease involving coronary bypass graft [I25.810] 10/27/2024 Yes    HTN  (hypertension) [I10] 10/27/2024 Yes      Problems Resolved During this Admission:    Diagnosis Date Noted Date Resolved POA    PRINCIPAL PROBLEM:  Fracture of left radius [S52.92XA] 10/27/2024 10/30/2024 Yes    Fracture of left ulna [S52.202A] 10/27/2024 10/30/2024 Yes    Closed fracture of left distal radius [S52.502A] 10/30/2024 11/01/2024 Yes       Discharged Condition: good    Disposition: Another Health Care Inst*    Follow Up:   Follow-up Information       [facility] Bed Follow up.    Contact information:  [address]  [facility] [address]  [phone]             Nabil Mckinley MD. Schedule an appointment as soon as possible for a visit in 2 week(s).    Specialty: Orthopedic Surgery  Why: Hospital follow up in 10-14 days  Contact information:  [address] [facility] [address]  [phone]                           Patient Instructions:   No discharge procedures on file.    Significant Diagnostic Studies: N/A    Pending Diagnostic Studies:       None           Medications:  Reconciled Home Medications:      Medication List        CONTINUE taking these medications      aspirin 81 MG EC tablet  Commonly known as: ECOTRIN  Take 81 mg by mouth once daily.     bisacodyL 5 mg EC tablet  Commonly known as: DULCOLAX  Take 5 mg by mouth daily as needed for Constipation.     carvediloL 12.5 MG tablet  Commonly known as: COREG  Take 12.5 mg by mouth 2 (two) times daily.     clopidogreL 75 mg tablet  Commonly known as: PLAVIX  Take 75 mg by mouth once daily.     furosemide 20 MG tablet  Commonly known as: LASIX  Take 20 mg by mouth once daily.     hydrALAZINE 25 MG tablet  Commonly known as: APRESOLINE  Take 25 mg by mouth 3 (three) times daily.     NovoLOG Mix 70-30FlexPen U-100 100 unit/mL (70-30) Inpn pen  Generic drug: insulin aspart protamine-insulin aspart  Inject into the skin.   Inject ten Units under the skin in the morning and ten Units in the evening. Inject with meals.      potassium chloride 10 MEQ Cpsr  Commonly known as: MICRO-K  Take 10 mEq by mouth once daily.     rosuvastatin 40 MG Tab  Commonly known as: CRESTOR  Take 20 mg by mouth every evening.     ZETIA 10 mg tablet  Generic drug: ezetimibe  Take 10 mg by mouth once daily.              Indwelling Lines/Drains at time of discharge:   Lines/Drains/Airways       Drain  Duration             Female External Urinary Catheter w/ Suction 10/27/24 1630 4 days                    Time spent on the discharge of patient: 40 minutes         Cara Ramirez DO  Department of Hospital Medicine  [facility] Tele[facility]

## 2024-11-01 NOTE — PLAN OF CARE
Received  acceptance from Kianna ROBLES at [facility] for today.  Cut off time is 5:00.  Dr. Ramirez and patient's daughter notified.  CM will continue to follow.

## 2024-11-01 NOTE — PLAN OF CARE
[facility] - Medical Surgical Unit  Initial Discharge Assessment       Primary Care Provider: Armaan Burch MD    Admission Diagnosis: Medical    Admission Date: 11/1/2024  Expected Discharge Date:     Transition of Care Barriers: None    Payor: MEDICARE / Plan: MEDICARE PART A & B / Product Type: Government /     Extended Emergency Contact Information  Primary Emergency Contact: Soo Rivera  Mobile Phone: [phone]  Relation: Daughter  Preferred language: English   needed? No    Discharge Plan A: Home with family, Home Health         [id number] - [place], [place] - [address] AT N[address] OF [facility] & [address]  [address]  [place] [address]  Phone: [phone] Fax: [phone]      Initial Assessment (most recent)       Adult Discharge Assessment - 11/01/24 1543          Discharge Assessment    Assessment Type Discharge Planning Assessment     Confirmed/corrected address, phone number and insurance Yes     Confirmed Demographics Correct on Facesheet     Source of Information patient;family     Reason For Admission weakness     People in Home child(delaney), adult     Facility Arrived From: [facility]     Do you expect to return to your current living situation? Yes     Do you have help at home or someone to help you manage your care at home? No     Who are your caregiver(s) and their phone number(s)? daughter and granddaughter     Prior to hospitilization cognitive status: Alert/Oriented     Current cognitive status: Alert/Oriented     Walking or Climbing Stairs Difficulty yes     Walking or Climbing Stairs ambulation difficulty, requires equipment;stair climbing difficulty, requires equipment;transferring difficulty, requires equipment     Mobility Management has rolling walker     Dressing/Bathing Difficulty no     Home Accessibility not wheelchair accessible     Home Layout Able to live on 1st floor     Equipment Currently Used at Home walker, rolling      Readmission within 30 days? No     Patient currently being followed by outpatient case management? No     Do you currently have service(s) that help you manage your care at home? No     Do you take prescription medications? Yes     Do you have prescription coverage? Yes     Coverage Medicare     Do you have any problems affording any of your prescribed medications? No     Is the patient taking medications as prescribed? yes     Who is going to help you get home at discharge? family     How do you get to doctors appointments? family or friend will provide     Are you on dialysis? No     Do you take coumadin? No     Discharge Plan A Home with family;Home Health     DME Needed Upon Discharge  walker, [place]     Discharge Plan discussed with: Patient;Adult children     Transition of Care Barriers None                   [facility] , pt admitted to skilled care for PT and OT and discharge planning is hoem with family and home health who can go [facility]. Will follow for discharge needs , looks like a [place] walker has been ordered from the [employer] .

## 2024-11-01 NOTE — PLAN OF CARE
Problem: Adult Inpatient Plan of Care  Goal: Plan of Care Review  Outcome: Met  Goal: Patient-Specific Goal (Individualized)  Outcome: Met  Goal: Absence of Hospital-Acquired Illness or Injury  Outcome: Met  Goal: Optimal Comfort and Wellbeing  Outcome: Met  Goal: Readiness for Transition of Care  Outcome: Met     Problem: Wound  Goal: Optimal Coping  Outcome: Met  Goal: Optimal Functional Ability  Outcome: Met  Goal: Absence of Infection Signs and Symptoms  Outcome: Met  Goal: Improved Oral Intake  Outcome: Met  Goal: Optimal Pain Control and Function  Outcome: Met  Goal: Skin Health and Integrity  Outcome: Met  Goal: Optimal Wound Healing  Outcome: Met     Problem: Diabetes Comorbidity  Goal: Blood Glucose Level Within Targeted Range  Outcome: Met     Problem: Fall Injury Risk  Goal: Absence of Fall and Fall-Related Injury  Outcome: Met     Problem: Gas Exchange Impaired  Goal: Optimal Gas Exchange  Outcome: Met

## 2024-11-01 NOTE — NURSING
In and out cath done and obtained speciman and sent to lab. Foam dressings applied to both heels and sacreal areas for protection, skin is intact.   Patient presents via ems from the [facility] dependent with G-tube leaking. Per ems pt was here Saturday for G-tube replacement, now was leaking at nursing home.

## 2024-11-01 NOTE — PLAN OF CARE
[facility] - [facility] Telemetry  Discharge Final Note    Primary Care Provider: Erin, Primary Doctor    Expected Discharge Date: 11/1/2024    Final Discharge Note (most recent)       Final Note - 11/01/24 1100          Final Note    Assessment Type Final Discharge Note     Anticipated Discharge Disposition Swing Bed     What phone number can be called within the next 1-3 days to see how you are doing after discharge? [phone]        Post-Acute Status    Post-Acute Authorization Placement     Post-Acute Placement Status Set-up Complete/Auth obtained     Patient choice form signed by patient/caregiver List with quality metrics by geographic area provided;List from CMS Compare;List from System Post-Acute Care     Discharge Delays None known at this time                     Important Message from Medicare  Important Message from Medicare regarding Discharge Appeal Rights: Given to patient/caregiver, Explained to patient/caregiver, Signed/date by patient/caregiver     Date IMM was signed: 10/31/24  Time IMM was signed: 1000     Follow-up providers       Nabil Mckinley MD   Specialty: Orthopedic Surgery    [address] [place] [address] [place] [address]   Phone: [phone]       Next Steps: Schedule an appointment as soon as possible for a visit on 11/14/2024    Instructions: Hospital follow up in 10-14 days; 9:30 am              After-discharge care                Destination       [facility]   Service: Skilled Nursing    [address] [address] [place] [address]   Phone: [phone]                             Patient will be discharged and transferred to [facility] today.  IM updated 10/31/2024 at 1000.  Packet made and taken to [facility] and placed in cubby.  Family and facility aware of transfer.  No further needs.

## 2024-11-01 NOTE — ASSESSMENT & PLAN NOTE
Status-post surgical intervention by Dr. Mckinley. Pain well controlled. Discharge to [facility] for continue PT/OT. Follow up with orthopedics in 10-14 days.   ---  Xray shows distal radius fracture, Orthopedics consulted, will hold patient asa and plavix, last dose of plavix was on Saturday evening.     RCRI score 3 15% 30 day risk of death MI or cardiac arrest, will get BNP in morning, if greater than 300 will need EKG after surgery and troponins trended daily for 24-48 hours after surgery. EKG unremarkable. No obvious contraindications to surgery or anesthesia.

## 2024-11-01 NOTE — NURSING
Pt discharged. Being transported downstairs via wheelchair with family and all personal belongings

## 2024-11-01 NOTE — PLAN OF CARE
Problem: Adult Inpatient Plan of Care  Goal: Plan of Care Review  Outcome: Progressing  Goal: Patient-Specific Goal (Individualized)  Outcome: Progressing  Goal: Absence of Hospital-Acquired Illness or Injury  Outcome: Progressing  Goal: Optimal Comfort and Wellbeing  Outcome: Progressing  Goal: Readiness for Transition of Care  Outcome: Progressing     Problem: Wound  Goal: Optimal Coping  Outcome: Progressing  Goal: Optimal Functional Ability  Outcome: Progressing  Goal: Absence of Infection Signs and Symptoms  Outcome: Progressing  Goal: Improved Oral Intake  Outcome: Progressing  Goal: Optimal Pain Control and Function  Outcome: Progressing  Goal: Skin Health and Integrity  Outcome: Progressing  Goal: Optimal Wound Healing  Outcome: Progressing     Problem: Diabetes Comorbidity  Goal: Blood Glucose Level Within Targeted Range  Outcome: Progressing     Problem: Fall Injury Risk  Goal: Absence of Fall and Fall-Related Injury  Outcome: Progressing     Problem: Gas Exchange Impaired  Goal: Optimal Gas Exchange  Outcome: Progressing

## 2024-11-01 NOTE — PLAN OF CARE
Problem: Adult Inpatient Plan of Care  Goal: Plan of Care Review  Outcome: Progressing     Problem: Diabetes Comorbidity  Goal: Blood Glucose Level Within Targeted Range  Outcome: Progressing     Problem: Wound  Goal: Optimal Functional Ability  Outcome: Progressing  Goal: Absence of Infection Signs and Symptoms  Outcome: Progressing  Goal: Optimal Pain Control and Function  Outcome: Progressing  Goal: Skin Health and Integrity  Outcome: Progressing     Problem: Pain Acute  Goal: Optimal Pain Control and Function  Outcome: Progressing     Problem: Fall Injury Risk  Goal: Absence of Fall and Fall-Related Injury  Outcome: Progressing

## 2024-11-01 NOTE — H&P
rincipal Problem:Fracture of left radius     Chief Complaint:       Chief Complaint   Patient presents with    Fall    Arm Injury         HPI: Patient is a 89 yo F who presents to [facility] for swing bed services after a fall. Patient reports she tripped over her great grandchild and fell on her outstretched left hand. Has had surgical rep[air, ORIF right radius/ulnar. Patient has a PMHx of HTN, CHF, CAD with CABG.        Initial presenting vitals in the ED HR 57, /80, Temp 97.6, Spo2 88% on RA. Initial presenting labs WBC 6.15, H/H 11.7/36.7, Platelet 139, PT 4.1, INR 1.10, Ptt 31.3, Na 136, K 4.6, Cl 102, Co2 32, Glu 300, Ca 8.8, BUN/Cr 17/1.08 CT head and neck negative for fractures.CXR cardiomegaly Xray wrist  complex fracture of distal radius and ulna. Orthopedic surgery consulted, start on ancef.        This patient admitted to [facility] for swing bed rehabilitation services and  medical management.            Past Medical History:   Diagnosis Date    Diabetes mellitus      Hypertension           History reviewed. No pertinent surgical history.     Review of patient's allergies indicates:  No Known Allergies     No current facility-administered medications on file prior to encounter.           Current Outpatient Medications on File Prior to Encounter   Medication Sig    aspirin (ECOTRIN) 81 MG EC tablet Take 81 mg by mouth once daily.    bisacodyL (DULCOLAX) 5 mg EC tablet Take 5 mg by mouth daily as needed for Constipation.    carvediloL (COREG) 12.5 MG tablet Take 12.5 mg by mouth 2 (two) times daily.    clopidogreL (PLAVIX) 75 mg tablet Take 75 mg by mouth once daily.    furosemide (LASIX) 20 MG tablet Take 20 mg by mouth once daily.    hydrALAZINE (APRESOLINE) 25 MG tablet Take 25 mg by mouth 3 (three) times daily.    NOVOLOG MIX 70-30FLEXPEN U-100 100 unit/mL (70-30) InPn pen Inject into the skin.   Inject ten Units under the skin in the morning and ten Units in the evening. Inject with  meals.    rosuvastatin (CRESTOR) 40 MG Tab Take 20 mg by mouth every evening.    potassium chloride (MICRO-K) 10 MEQ CpSR Take 10 mEq by mouth once daily.      Family History    None              Tobacco Use    Smoking status: Never    Smokeless tobacco: Never   Substance and Sexual Activity    Alcohol use: Never    Drug use: Never    Sexual activity: Never      Review of Systems   Constitutional:  Positive for fatigue. Negative for fever.   Respiratory:  Negative for cough and shortness of breath.    Cardiovascular:  Negative for chest pain.   Gastrointestinal:  Negative for abdominal pain, constipation, diarrhea, nausea and vomiting.   Musculoskeletal:  Positive for arthralgias and joint swelling.   Neurological:  Negative for weakness and headaches.      Objective:      Vital Signs (Most Recent):  Temp: 98.9 °F (37.2 °C) (10/27/24 1700)  Pulse: 63 (10/27/24 1919)  Resp: 16 (10/27/24 1921)  BP: (!) 151/45 (10/27/24 1919)  SpO2: 95 % (10/27/24 1916) Vital Signs (24h Range):  Temp:  [97.6 °F (36.4 °C)-98.9 °F (37.2 °C)] 98.9 °F (37.2 °C)  Pulse:  [55-63] 63  Resp:  [0-26] 16  SpO2:  [88 %-100 %] 95 %  BP: (151-195)/(45-80) 151/45      Weight: 65.8 kg (145 lb)  Body mass index is 27.4 kg/m².     Physical Exam  Vitals and nursing note reviewed.   Constitutional:       Appearance: Normal appearance.   HENT:      Head: Normocephalic.      Right Ear: External ear normal.      Left Ear: External ear normal.      Nose: Nose normal.      Mouth/Throat:      Pharynx: Oropharynx is clear.   Eyes:      Conjunctiva/sclera: Conjunctivae normal.   Cardiovascular:      Rate and Rhythm: Normal rate and regular rhythm.      Pulses: Normal pulses.      Heart sounds: Normal heart sounds.   Pulmonary:      Effort: Pulmonary effort is normal.      Breath sounds: Normal breath sounds.   Abdominal:      General: Abdomen is flat.      Palpations: Abdomen is soft.   Musculoskeletal:      Right forearm: Normal.      Left forearm: Swelling,  "deformity and tenderness present.      Right lower leg: Edema present.      Left lower leg: Edema present.   Skin:     General: Skin is warm.   Neurological:      Mental Status: She is alert and oriented to person, place, and time.   Psychiatric:         Mood and Affect: Mood normal.         Behavior: Behavior normal.         Thought Content: Thought content normal.         Judgment: Judgment normal.                  Significant Labs: All pertinent labs within the past 24 hours have been reviewed.     Significant Imaging: I have reviewed all pertinent imaging results/findings within the past 24 hours.  Assessment/Plan:      * Fracture of left radius  Xray shows distal radius fracture, Orthopedics consulted, will hold patient asa and plavix, last dose of plavix was on Saturday evening.   RCRI score 3 15% 30 day risk of death MI or cardiac arrest, will get BNP in morning, if greater than 300 will need EKG after surgery and troponins trended daily for 24-48 hours after surgery. EKG unremarkable. Cleared for surgery.         Fracture of left ulna  See plan above, xray shows distal ulna fracture.         Coronary artery disease involving coronary bypass graft  Patient with known CAD s/p CABG, which is controlled Will continue ASA, Plavix, and Statin and monitor for S/Sx of angina/ACS. Continue to monitor on telemetry.   Will hold plavix, asa for surgery at this time.      Type 2 diabetes mellitus, without long-term current use of insulin  Patient's FSGs are uncontrolled due to hyperglycemia on current medication regimen.  Last A1c reviewed- No results found for: "LABA1C", "HGBA1C"  Most recent fingerstick glucose reviewed- No results for input(s): "POCTGLUCOSE" in the last 24 hours.  Current correctional scale  Low  Maintain anti-hyperglycemic dose as follows-   Antihyperglycemics (From admission, onward)        Start     Stop Route Frequency Ordered     10/27/24 2039   insulin aspart U-100 injection 0-5 Units         -- " SubQ Before meals & nightly PRN 10/27/24 1943          Will get A1c on morning labs.   Hold Oral hypoglycemics while patient is in the hospital.           HTN (hypertension)  Patients blood pressure range in the last 24 hours was: BP  Min: 151/45  Max: 195/80.The patient's inpatient anti-hypertensive regimen is listed below:  Current Antihypertensives  , 2 times daily, Oral  , Daily, Oral  , 3 times daily, Oral  hydrALAZINE tablet 25 mg, 3 times daily, Oral  carvediloL tablet 12.5 mg, 2 times daily, Oral  furosemide tablet 20 mg, Daily, Oral     Plan  - BP is controlled, no changes needed to their regimen  -

## 2024-11-01 NOTE — ASSESSMENT & PLAN NOTE
Patients blood pressure range in the last 24 hours was:  Vitals:    10/31/24 1104 10/31/24 1556 10/31/24 1913 10/31/24 2045   BP: 138/70 119/66 106/61 106/61    10/31/24 2326 11/01/24 0352 11/01/24 0759   BP: 125/68 (!) 126/52 125/64       The patient's inpatient anti-hypertensive regimen is listed below:    Current Antihypertensives  carvediloL tablet 12.5 mg, 2 times daily, Oral  furosemide tablet 20 mg, Daily, Oral    Plan  - BP is controlled, no changes needed to their regimen

## 2024-11-01 NOTE — PT/OT/SLP PROGRESS
Physical Therapy Treatment    Patient Name:  Jayme Mcgraw   MRN:  [id number]    Recommendations:     Discharge Recommendations: Moderate Intensity Therapy  Discharge Equipment Recommendations: walker, [place]  Barriers to discharge: Inaccessible home and Decreased caregiver support    Assessment:     Jayme Mcgraw is a 89 y.o. female admitted with a medical diagnosis of Fracture of left radius.  She presents with the following impairments/functional limitations: weakness, impaired self care skills, impaired functional mobility, gait instability, decreased upper extremity function, orthopedic precautions Pt is improving in strength and mobility. After trying platform walker yesterday, Pt stated that it was too bundlesome and prefers to use hemiwalker for stability during ambulation. Pt is very motivated but has tendency to be overly eager and move too quickly causing slight loss of balance. Will be going to swb today    Rehab Prognosis: Good; patient would benefit from acute skilled PT services to address these deficits and reach maximum level of function.    Recent Surgery: Procedure(s) (LRB):  ORIF, FRACTURE, RADIUS (Left)  DEBRIDEMENT, SKIN, FASCIA, MUSCLE, AND BONE, OPEN FRACTURE SITE (Left) 4 Days Post-Op    Plan:     During this hospitalization, patient to be seen 5 x/week to address the identified rehab impairments via gait training, therapeutic activities, therapeutic exercises and progress toward the following goals:    Plan of Care Expires:  11/30/24    Subjective     Chief Complaint: LUE fracture  Patient/Family Comments/goals: Pt is agreeable to PT   Pain/Comfort:  Pain Rating 1: 0/10  Pain Rating Post-Intervention 1: 0/10      Objective:     Communicated with AIYANA Sheikh RN prior to session.  Patient found HOB elevated with peripheral IV, oxygen upon PT entry to room.     General Precautions: Standard, fall  Orthopedic Precautions: LUE non weight bearing  Braces: UE Sling  Respiratory Status: Nasal cannula,  flow 2 L/min     Functional Mobility:  Bed Mobility:     Scooting: contact guard assistance  Supine to Sit: contact guard assistance  Transfers:     Sit to Stand:  stand by assistance with hemiwalker  Bed to Chair: stand by assistance with  hemiwalker  using  Step Transfer  Toilet Transfer: contact guard assistance with  hemiwalker  using  Step Transfer  Gait: 75 ft stand-by assistance with hemiwalker, slow dinesh, short step length  Balance: good      AM-PAC 6 CLICK MOBILITY  Turning over in bed (including adjusting bedclothes, sheets and blankets)?: 3  Sitting down on and standing up from a chair with arms (e.g., wheelchair, bedside commode, etc.): 4  Moving from lying on back to sitting on the side of the bed?: 3  Moving to and from a bed to a chair (including a wheelchair)?: 4  Need to walk in hospital room?: 3  Climbing 3-5 steps with a railing?: 3  Basic Mobility Total Score: 20       Treatment & Education:  Pt performed bilateral LE: seated exercises: ankle pumps, long arc quads, marches, and hip abduction x 45 each  Sit to stand x 3, standing hip flexion 3x10    Patient left up in chair with all lines intact and call button in reach..    GOALS:   Multidisciplinary Problems       Physical Therapy Goals          Problem: Physical Therapy    Goal Priority Disciplines Outcome Interventions   Physical Therapy Goal     PT, PT/OT Progressing    Description: Short Term Goals to be met by: 24    Patient will increase functional independence with mobility by performin. Supine to sit with Stand by assist  2. Sit to stand transfer with contact guard assist using Roderick-walker  3. Bed to chair transfer with contact guard assist using Roderick-walker  4. Gait  x 100 feet with contact guard assist using lowest level of assistive device  5. Lower extremity exercise program x30 reps per handout, with assistance as needed    Long Term Goals to be met by: 24    Pt will regain full independent functional mobility  with lowest level of assistive device to return to home situation and prior activities of daily living.                        Time Tracking:     PT Received On: 11/01/24  PT Start Time: 1005     PT Stop Time: 1037  PT Total Time (min): 32 min     Billable Minutes: Gait Training 15 and Therapeutic Exercise 15    Treatment Type: Treatment  PT/PTA: PT     Number of PTA visits since last PT visit: 0     11/01/2024

## 2024-11-01 NOTE — ASSESSMENT & PLAN NOTE
Patient's FSGs are controlled on current medication regimen.     Last A1c reviewed-   Lab Results   Component Value Date    HGBA1C 7.7 (H) 10/28/2024     Continue medications per discharge medication reconciliation.

## 2024-11-02 LAB
GLUCOSE SERPL-MCNC: 156 MG/DL (ref 70–105)
GLUCOSE SERPL-MCNC: 161 MG/DL (ref 70–105)
GLUCOSE SERPL-MCNC: 204 MG/DL (ref 70–105)
GLUCOSE SERPL-MCNC: 320 MG/DL (ref 70–105)

## 2024-11-02 PROCEDURE — 82962 GLUCOSE BLOOD TEST: CPT

## 2024-11-02 PROCEDURE — 27000958

## 2024-11-02 PROCEDURE — 25000003 PHARM REV CODE 250: Performed by: NURSE PRACTITIONER

## 2024-11-02 PROCEDURE — 27000982 HC MATTRESS, MATRIX LAL RENTAL

## 2024-11-02 PROCEDURE — 63600175 PHARM REV CODE 636 W HCPCS: Performed by: NURSE PRACTITIONER

## 2024-11-02 PROCEDURE — 11000004 HC SNF PRIVATE

## 2024-11-02 PROCEDURE — 94761 N-INVAS EAR/PLS OXIMETRY MLT: CPT

## 2024-11-02 RX ADMIN — MUPIROCIN: 20 OINTMENT TOPICAL at 08:11

## 2024-11-02 RX ADMIN — CARVEDILOL 12.5 MG: 6.25 TABLET, FILM COATED ORAL at 09:11

## 2024-11-02 RX ADMIN — FUROSEMIDE 20 MG: 20 TABLET ORAL at 09:11

## 2024-11-02 RX ADMIN — INSULIN HUMAN 10 UNITS: 100 INJECTION, SUSPENSION SUBCUTANEOUS at 06:11

## 2024-11-02 RX ADMIN — CARVEDILOL 12.5 MG: 6.25 TABLET, FILM COATED ORAL at 08:11

## 2024-11-02 RX ADMIN — SENNOSIDES AND DOCUSATE SODIUM 1 TABLET: 50; 8.6 TABLET ORAL at 08:11

## 2024-11-02 RX ADMIN — SENNOSIDES AND DOCUSATE SODIUM 1 TABLET: 50; 8.6 TABLET ORAL at 09:11

## 2024-11-02 RX ADMIN — ATORVASTATIN CALCIUM 40 MG: 40 TABLET, FILM COATED ORAL at 08:11

## 2024-11-02 RX ADMIN — INSULIN ASPART 2 UNITS: 100 INJECTION, SOLUTION INTRAVENOUS; SUBCUTANEOUS at 05:11

## 2024-11-02 RX ADMIN — ASPIRIN 81 MG: 81 TABLET, COATED ORAL at 09:11

## 2024-11-02 RX ADMIN — INSULIN ASPART 2 UNITS: 100 INJECTION, SOLUTION INTRAVENOUS; SUBCUTANEOUS at 08:11

## 2024-11-02 RX ADMIN — INSULIN HUMAN 10 UNITS: 100 INJECTION, SUSPENSION SUBCUTANEOUS at 05:11

## 2024-11-02 RX ADMIN — CLOPIDOGREL BISULFATE 75 MG: 75 TABLET ORAL at 09:11

## 2024-11-02 NOTE — CONSULTS
[facility] - Medical Surgical Unit  Adult Nutrition  Consult Note         Reason for Assessment  Reason For Assessment: consult (assess/educate regarding nutritional needs)   Nutrition Risk Screen: no indicators present    Assessment and Plan  Consult received and appreciated. Consult to assess/educate regarding nutritional needs. Patient is a 89yo female admitted from [facility] for continued rehab.     Patient is 64.4kg with a BMI of 26.83. She is ordered a 2000kcal Consistent Carbohydrate diet with chopped meats. No intake has been documented at this time. Recommend continue current diet as tolerated. Encourage good PO intakes.     Last BM 10/31 per flowsheet.     Medications/labs reviewed. RD following.       Learning Needs/Social Determinants of Health  Learning Assessment       11/01/2024 1600 [facility] (11/1/2024 - Present)   Created by Emily Marques RN - RN (Nurse) Status: Complete                 PRIMARY LEARNER     Primary Learner Name:  peña tidwell YT - 11/01/2024 1600    Relationship:  Patient YT - 11/01/2024 1600    Does the primary learner have any barriers to learning?:  No Barriers YT - 11/01/2024 1600    What is the preferred language of the primary learner?:  English YT - 11/01/2024 1600    Is an  required?:  No YT - 11/01/2024 1600    How does the primary learner prefer to learn new concepts?:  Listening YT - 11/01/2024 1600    How often do you need to have someone help you read instructions, pamphlets, or written material from your doctor or pharmacy?:  Rarely YT - 11/01/2024 1600        CO-LEARNER #1     No question answered        CO-LEARNER #2     No question answered        SPECIAL TOPICS     No question answered        ANSWERED BY:     No question answered        Comments         Edit History       Emily Marques, RN - RN (Nurse)   11/01/2024 1600                           Social Drivers of Health     Tobacco Use: Low Risk   (11/1/2024)    Patient History     Smoking Tobacco Use: Never     Smokeless Tobacco Use: Never     Passive Exposure: Not on file   Alcohol Use: Not At Risk (10/28/2024)    AUDIT-C     Frequency of Alcohol Consumption: Never     Average Number of Drinks: Patient does not drink     Frequency of Binge Drinking: Never   Financial Resource Strain: Low Risk  (10/28/2024)    Overall Financial Resource Strain (CARDIA)     Difficulty of Paying Living Expenses: Not very hard   Food Insecurity: No Food Insecurity (10/28/2024)    Hunger Vital Sign     Worried About Running Out of Food in the Last Year: Never true     Ran Out of Food in the Last Year: Never true   Transportation Needs: No Transportation Needs (10/28/2024)    TRANSPORTATION NEEDS     Transportation : No   Physical Activity: Inactive (10/28/2024)    Exercise Vital Sign     Days of Exercise per Week: 0 days     Minutes of Exercise per Session: 0 min   Stress: No Stress Concern Present (10/28/2024)    [employer] - Occupational Stress Questionnaire     Feeling of Stress : Only a little   Housing Stability: Low Risk  (10/28/2024)    Housing Stability Vital Sign     Unable to Pay for Housing in the Last Year: No     Homeless in the Last Year: No   Depression: Not at risk (9/16/2024)    Received from [facility]    PHQ-2     PHQ-2 Score: 0   Utilities: Not At Risk (10/28/2024)    [facility] Utilities     Threatened with loss of utilities: No   Health Literacy: Inadequate Health Literacy (10/28/2024)     Health Literacy     Frequency of need for help with medical instructions: Sometimes   Social Isolation: Moderately Integrated (10/28/2024)    Social Isolation     Social Isolation: 2            Malnutrition  Is Patient Malnourished: No    Nutrition Diagnosis  Altered nutrition related laboratory values related to Diabetes Mellitus as evidenced by elevated BG, elevated HbA1c  Comments: continue consistent  carbohydrate diet    Recent Labs   Lab 11/01/24  1650 11/01/24  2121 11/02/24  0555   *  --   --    POCGLU  --    < > 156*    < > = values in this interval not displayed.     Comments on Glucose: Glucose elevated. PMH DM2.     Nutrition Prescription / Recommendations  Recommendation/Intervention: Recommend continue current diet as tolerated. Encourage good PO intakes.  Goals: Weight maintenance during admission, intake 50-75% of meals during admission  Nutrition Goal Status: new  Current Diet Order: 2000kcal Consistent Carbohdyrate Diet with chopped meats  Chewing or Swallowing Difficulty?: Edentulous : no dentures present  Recommended Diet: Consistent Carbohydrate 2000 (75g Carbs)  Recommended Oral Supplement: No Oral Supplements  Is Nutrition Support Recommended: [facility] Nutrition Support: No  Is Nutrition Education Recommended: No    Monitor and Evaluation  % current Intake: New Diet order with no P.O. intake documented currently  % intake to meet estimated needs: 50 - 75 %  Food and Nutrient Intake: food and beverage intake  Food and Nutrient Adminstration: diet order  Anthropometric Measurements: weight, weight change  Biochemical Data, Medical Tests and Procedures: electrolyte and renal panel, gastrointestinal profile, glucose/endocrine profile, inflammatory profile, lipid profile       Current Medical Diagnosis and Past Medical History  Diagnosis: other (see comments)  Past Medical History:   Diagnosis Date    Anticoagulant long-term use     plalvix    CHF (congestive heart failure)     Diabetes mellitus     High cholesterol     being treated with ezetimibe and rosuvastatin    Hypertension        Nutrition/Diet History       Lab/Procedures/Meds  Recent Labs   Lab 11/01/24  1650      K 3.3*   BUN 13   CREATININE 1.33*   CALCIUM 8.6   ALBUMIN 2.5*      ALT 16   AST 32   PHOS 2.5   Note: K+ low. Cr elevated. Alb low    Last A1c:   Lab Results   Component Value Date    HGBA1C 7.7 (H)  "10/28/2024   Note: HbA1c elevated. PMH DM2.     Lab Results   Component Value Date    RBC 3.44 (L) 11/01/2024    HGB 10.0 (L) 11/01/2024    HCT 31.3 (L) 11/01/2024    MCV 91.0 11/01/2024    MCH 29.1 11/01/2024    MCHC 31.9 (L) 11/01/2024   Note: H&H low    Pertinent Labs Reviewed: reviewed  Pertinent Medications Reviewed: reviewed  Scheduled Meds:   aspirin  81 mg Oral Daily    atorvastatin  40 mg Oral QHS    carvediloL  12.5 mg Oral BID    clopidogreL  75 mg Oral Daily    furosemide  20 mg Oral Daily    insulin NPH-insulin regular (70/30)  10 Units Subcutaneous BID AC    mupirocin   Nasal BID    senna-docusate 8.6-50 mg  1 tablet Oral BID     Continuous Infusions:  PRN Meds:.  Current Facility-Administered Medications:     acetaminophen, 650 mg, Oral, Q4H PRN    calcium carbonate, 500 mg, Oral, BID PRN    dextrose 10%, 12.5 g, Intravenous, PRN    dextrose 10%, 25 g, Intravenous, PRN    diphenhydrAMINE, 25 mg, Intravenous, Q6H PRN    glucagon (human recombinant), 1 mg, Intramuscular, PRN    glucose, 16 g, Oral, PRN    glucose, 24 g, Oral, PRN    HYDROcodone-acetaminophen, 1 tablet, Oral, Q6H PRN    insulin aspart U-100, 0-5 Units, Subcutaneous, QID (AC + HS) PRN    melatonin, 6 mg, Oral, Nightly PRN    morphine, 2 mg, Intravenous, Q4H PRN    naloxone, 0.02 mg, Intravenous, PRN    ondansetron, 4 mg, Intravenous, Q8H PRN    ondansetron, 4 mg, Intravenous, Daily PRN    sodium chloride 0.9%, 10 mL, Intravenous, Q12H PRN    Anthropometrics  Temp: 97.3 °F (36.3 °C)  Height: 5' 1" (154.9 cm)  Height (inches): 61 in  Weight Method: Bed Scale  Weight: 64.4 kg (142 lb)  Weight (lb): 142 lb  Ideal Body Weight (IBW), Female: 105 lb  % Ideal Body Weight, Female (lb): 135.24 %  BMI (Calculated): 26.8       Estimated/Assessed Needs  RMR ([place] Equation): 981.49     Temp: 97.3 °F (36.3 °C)Oral  Weight Used For Calorie Calculations: 64.4 kg (141 lb 15.6 oz)     Energy Calorie Requirements (kcal): 1610-1932kcal " (25-30kcal/kg)  Weight Used For Protein Calculations: 64.4 kg (141 lb 15.6 oz)  Protein Requirements: 65-77g (1.0-1.2g/kg)       RDA Method (mL): 1610       Nutrition by Nursing  Diet/Nutrition Received: consistent carb/diabetic diet           Last Bowel Movement: 10/30/24                Nutrition Follow-Up  RD Follow-up?: Yes      Nutrition Discharge Planning: RD following for discharge needs          Geri Triana MS, RD, LD  Available via [employer]

## 2024-11-03 LAB
GLUCOSE SERPL-MCNC: 187 MG/DL (ref 70–105)
GLUCOSE SERPL-MCNC: 203 MG/DL (ref 70–105)
GLUCOSE SERPL-MCNC: 238 MG/DL (ref 70–105)
GLUCOSE SERPL-MCNC: 246 MG/DL (ref 70–105)

## 2024-11-03 PROCEDURE — 63600175 PHARM REV CODE 636 W HCPCS: Performed by: NURSE PRACTITIONER

## 2024-11-03 PROCEDURE — 11000004 HC SNF PRIVATE

## 2024-11-03 PROCEDURE — 25000003 PHARM REV CODE 250: Performed by: NURSE PRACTITIONER

## 2024-11-03 PROCEDURE — 82962 GLUCOSE BLOOD TEST: CPT

## 2024-11-03 PROCEDURE — 27000958

## 2024-11-03 PROCEDURE — 94761 N-INVAS EAR/PLS OXIMETRY MLT: CPT

## 2024-11-03 PROCEDURE — 27000982 HC MATTRESS, MATRIX LAL RENTAL

## 2024-11-03 RX ADMIN — CARVEDILOL 12.5 MG: 6.25 TABLET, FILM COATED ORAL at 09:11

## 2024-11-03 RX ADMIN — INSULIN ASPART 2 UNITS: 100 INJECTION, SOLUTION INTRAVENOUS; SUBCUTANEOUS at 11:11

## 2024-11-03 RX ADMIN — INSULIN ASPART 2 UNITS: 100 INJECTION, SOLUTION INTRAVENOUS; SUBCUTANEOUS at 05:11

## 2024-11-03 RX ADMIN — CARVEDILOL 12.5 MG: 6.25 TABLET, FILM COATED ORAL at 08:11

## 2024-11-03 RX ADMIN — MUPIROCIN: 20 OINTMENT TOPICAL at 08:11

## 2024-11-03 RX ADMIN — INSULIN HUMAN 10 UNITS: 100 INJECTION, SUSPENSION SUBCUTANEOUS at 05:11

## 2024-11-03 RX ADMIN — INSULIN ASPART 1 UNITS: 100 INJECTION, SOLUTION INTRAVENOUS; SUBCUTANEOUS at 09:11

## 2024-11-03 RX ADMIN — FUROSEMIDE 20 MG: 20 TABLET ORAL at 08:11

## 2024-11-03 RX ADMIN — INSULIN HUMAN 10 UNITS: 100 INJECTION, SUSPENSION SUBCUTANEOUS at 06:11

## 2024-11-03 RX ADMIN — ASPIRIN 81 MG: 81 TABLET, COATED ORAL at 08:11

## 2024-11-03 RX ADMIN — SENNOSIDES AND DOCUSATE SODIUM 1 TABLET: 50; 8.6 TABLET ORAL at 08:11

## 2024-11-03 RX ADMIN — CLOPIDOGREL BISULFATE 75 MG: 75 TABLET ORAL at 08:11

## 2024-11-03 RX ADMIN — ATORVASTATIN CALCIUM 40 MG: 40 TABLET, FILM COATED ORAL at 09:11

## 2024-11-03 RX ADMIN — MUPIROCIN: 20 OINTMENT TOPICAL at 09:11

## 2024-11-04 LAB
GLUCOSE SERPL-MCNC: 202 MG/DL (ref 70–105)
GLUCOSE SERPL-MCNC: 203 MG/DL (ref 70–105)
GLUCOSE SERPL-MCNC: 214 MG/DL (ref 70–105)
GLUCOSE SERPL-MCNC: 234 MG/DL (ref 70–105)

## 2024-11-04 PROCEDURE — 11000004 HC SNF PRIVATE

## 2024-11-04 PROCEDURE — 63600175 PHARM REV CODE 636 W HCPCS: Performed by: NURSE PRACTITIONER

## 2024-11-04 PROCEDURE — 97162 PT EVAL MOD COMPLEX 30 MIN: CPT

## 2024-11-04 PROCEDURE — 63600175 PHARM REV CODE 636 W HCPCS: Performed by: FAMILY MEDICINE

## 2024-11-04 PROCEDURE — 97165 OT EVAL LOW COMPLEX 30 MIN: CPT

## 2024-11-04 PROCEDURE — 82962 GLUCOSE BLOOD TEST: CPT

## 2024-11-04 PROCEDURE — 99900035 HC TECH TIME PER 15 MIN (STAT)

## 2024-11-04 PROCEDURE — 94761 N-INVAS EAR/PLS OXIMETRY MLT: CPT

## 2024-11-04 PROCEDURE — 25000003 PHARM REV CODE 250: Performed by: NURSE PRACTITIONER

## 2024-11-04 RX ORDER — INSULIN ASPART 100 [IU]/ML
10 INJECTION, SUSPENSION SUBCUTANEOUS 2 TIMES DAILY WITH MEALS
Status: DISCONTINUED | OUTPATIENT
Start: 2024-11-04 | End: 2024-11-08 | Stop reason: HOSPADM

## 2024-11-04 RX ADMIN — INSULIN HUMAN 10 UNITS: 100 INJECTION, SUSPENSION SUBCUTANEOUS at 06:11

## 2024-11-04 RX ADMIN — ATORVASTATIN CALCIUM 40 MG: 40 TABLET, FILM COATED ORAL at 08:11

## 2024-11-04 RX ADMIN — INSULIN ASPART 2 UNITS: 100 INJECTION, SOLUTION INTRAVENOUS; SUBCUTANEOUS at 10:11

## 2024-11-04 RX ADMIN — ACETAMINOPHEN 650 MG: 325 TABLET ORAL at 08:11

## 2024-11-04 RX ADMIN — SENNOSIDES AND DOCUSATE SODIUM 1 TABLET: 50; 8.6 TABLET ORAL at 08:11

## 2024-11-04 RX ADMIN — CARVEDILOL 12.5 MG: 6.25 TABLET, FILM COATED ORAL at 08:11

## 2024-11-04 RX ADMIN — CLOPIDOGREL BISULFATE 75 MG: 75 TABLET ORAL at 08:11

## 2024-11-04 RX ADMIN — ASPIRIN 81 MG: 81 TABLET, COATED ORAL at 08:11

## 2024-11-04 RX ADMIN — MUPIROCIN: 20 OINTMENT TOPICAL at 08:11

## 2024-11-04 RX ADMIN — INSULIN ASPART 2 UNITS: 100 INJECTION, SOLUTION INTRAVENOUS; SUBCUTANEOUS at 04:11

## 2024-11-04 RX ADMIN — FUROSEMIDE 20 MG: 20 TABLET ORAL at 08:11

## 2024-11-04 RX ADMIN — INSULIN ASPART 1 UNITS: 100 INJECTION, SOLUTION INTRAVENOUS; SUBCUTANEOUS at 08:11

## 2024-11-04 RX ADMIN — INSULIN ASPART 10 UNITS: 100 INJECTION, SUSPENSION SUBCUTANEOUS at 04:11

## 2024-11-04 NOTE — PT/OT/SLP EVAL
Physical Therapy Evaluation    Patient Name:  Jayme Mcgraw   MRN:  [id number]    Recommendations:     Discharge Recommendations: Low Intensity Therapy   Discharge Equipment Recommendations: walker, [place]   Barriers to discharge: None    Assessment:     Jayme Mcgraw is a 89 y.o. female admitted with a medical diagnosis of Fracture of distal end of left radius and ulna, closed, initial encounter.  She presents with the following impairments/functional limitations: weakness, gait instability, decreased upper extremity function, decreased lower extremity function, impaired balance, impaired functional mobility.     Rehab Prognosis: Good; patient would benefit from acute skilled PT services to address these deficits and reach maximum level of function.    Recent Surgery: * No surgery found *      Plan:     During this hospitalization, patient to be seen 5 x/week to address the identified rehab impairments via gait training, therapeutic exercises, therapeutic activities, neuromuscular re-education and progress toward the following goals:    Plan of Care Expires:  11/30/24    Subjective     Chief Complaint: Patient without complaint and is agreeable to treatment today.   Patient/Family Comments/goals:   Pain/Comfort:  Pain Rating 1: 0/10    Patients cultural, spiritual, Evangelical conflicts given the current situation: no    Living Environment:  Patient lives alone in a single story home with no steps required for main entry way and 2 steps within the home  Prior to admission, patients level of function was independetn.  Equipment used at home: rollator, cane, straight.  DME owned (not currently used): none.  Upon discharge, patient will have assistance from family.    Objective:     Communicated with nursing staff prior to session.  Patient found supine with    upon PT entry to room.    General Precautions: Standard, fall  Orthopedic Precautions:LUE non weight bearing   Braces: N/A  Respiratory Status: Room  air    Exams:  RLE ROM: WFL  RLE Strength: Deficits: 3+/5 gross  LLE ROM: WFL  LLE Strength: Deficits: 3+/5 gross    Functional Mobility:  Bed Mobility:     Rolling Left:  moderate assistance  Rolling Right: stand by assistance  Supine to Sit: contact guard assistance  Sit to Supine: minimum assistance  Transfers:     Sit to Stand:  minimum assistance with hemiwalker  Bed to Chair: minimum assistance with  hemiwalker  using  Stand Pivot  Gait: 75 feet with use of front wheeled walker      AM-PAC 6 CLICK MOBILITY  Total Score:17           Patient left supine with call button in reach.    GOALS:   Multidisciplinary Problems       Physical Therapy Goals          Problem: Physical Therapy    Goal Priority Disciplines Outcome Interventions   Physical Therapy Goal     PT, PT/OT Progressing    Description: Short Term Goals to be met by: 24    Patient will increase functional independence with mobility by performin. Supine to sit with Stand by assist  2. Sit to stand transfer with contact guard assist using Roderick-walker  3. Bed to chair transfer with contact guard assist using Roderick-walker  4. Gait  x 100 feet with contact guard assist using lowest level of assistive device  5. Lower extremity exercise program x30 reps per handout, with assistance as needed    Long Term Goals to be met by: 24    Pt will regain full independent functional mobility with lowest level of assistive device to return to home situation and prior activities of daily living.                        History:     Past Medical History:   Diagnosis Date    Anticoagulant long-term use     plalvix    CHF (congestive heart failure)     Diabetes mellitus     High cholesterol     being treated with ezetimibe and rosuvastatin    Hypertension        Past Surgical History:   Procedure Laterality Date    CORONARY ARTERY BYPASS GRAFT      twice    DEBRIDEMENT, SKIN, FASCIA, MUSCLE, AND BONE, OPEN FRACTURE SITE Left 10/28/2024    Procedure:  DEBRIDEMENT, SKIN, FASCIA, MUSCLE, AND BONE, OPEN FRACTURE SITE;  Surgeon: Nabil Mckinley MD;  Location: [facility] OR;  Service: Orthopedics;  Laterality: Left;    HYSTERECTOMY      OPEN REDUCTION AND INTERNAL FIXATION (ORIF) OF FRACTURE OF RADIUS Left 10/28/2024    Procedure: ORIF, FRACTURE, RADIUS;  Surgeon: Nabil Mckinley MD;  Location: [facility];  Service: Orthopedics;  Laterality: Left;       Time Tracking:     PT Received On: 11/04/24  PT Start Time: 0900     PT Stop Time: 0915  PT Total Time (min): 15 min     Billable Minutes: Evaluation 15      11/04/2024

## 2024-11-04 NOTE — H&P
[facility] - Medical Surgical Unit  Hospital Medicine  History & Physical    Patient Name: Jayme Mcgraw  MRN: [id number]  Patient Class: IP- Swing  Admission Date: 11/1/2024  Attending Physician: Qamar Pettit Jr., MD   Primary Care Provider: Armaan Burch MD         Patient information was obtained from patient and ER records.     Subjective:     Principal Problem:Fracture of distal end of left radius and ulna, closed, initial encounter    Chief Complaint:   Chief Complaint   Patient presents with    Wrist Injury        HPI: .  Patient is a 89-year-old who fell injured her wrist is noted to be broken surgical repair of the fracture.  Patient had been doing her activities daily living prior to that.  She does have house.  She was transferred here rehab.  Patient's states that she wants to go home.  Patient has had no acute event i.e. seizure or other event stroke cause the fall.  Patient has been very active at home.  She was admitted for rehabilitation    Past Medical History:   Diagnosis Date    Anticoagulant long-term use     plalvix    CHF (congestive heart failure)     Diabetes mellitus     High cholesterol     being treated with ezetimibe and rosuvastatin    Hypertension        Past Surgical History:   Procedure Laterality Date    CORONARY ARTERY BYPASS GRAFT      twice    DEBRIDEMENT, SKIN, FASCIA, MUSCLE, AND BONE, OPEN FRACTURE SITE Left 10/28/2024    Procedure: DEBRIDEMENT, SKIN, FASCIA, MUSCLE, AND BONE, OPEN FRACTURE SITE;  Surgeon: Nabil Mckinley MD;  Location: [facility];  Service: Orthopedics;  Laterality: Left;    HYSTERECTOMY      OPEN REDUCTION AND INTERNAL FIXATION (ORIF) OF FRACTURE OF RADIUS Left 10/28/2024    Procedure: ORIF, FRACTURE, RADIUS;  Surgeon: Nabil Mckinley MD;  Location: [facility];  Service: Orthopedics;  Laterality: Left;       Review of patient's allergies indicates:  No Known Allergies    No current facility-administered medications on file  prior to encounter.     Current Outpatient Medications on File Prior to Encounter   Medication Sig    aspirin (ECOTRIN) 81 MG EC tablet Take 81 mg by mouth once daily.    bisacodyL (DULCOLAX) 5 mg EC tablet Take 5 mg by mouth daily as needed for Constipation.    carvediloL (COREG) 12.5 MG tablet Take 12.5 mg by mouth 2 (two) times daily.    clopidogreL (PLAVIX) 75 mg tablet Take 75 mg by mouth once daily.    ezetimibe (ZETIA) 10 mg tablet Take 10 mg by mouth once daily.    furosemide (LASIX) 20 MG tablet Take 20 mg by mouth once daily.    hydrALAZINE (APRESOLINE) 25 MG tablet Take 25 mg by mouth 3 (three) times daily.    NOVOLOG MIX 70-30FLEXPEN U-100 100 unit/mL (70-30) InPn pen Inject into the skin.   Inject ten Units under the skin in the morning and ten Units in the evening. Inject with meals.    potassium chloride (MICRO-K) 10 MEQ CpSR Take 10 mEq by mouth once daily.    rosuvastatin (CRESTOR) 40 MG Tab Take 20 mg by mouth every evening.     Family History    None       Tobacco Use    Smoking status: Never    Smokeless tobacco: Never   Substance and Sexual Activity    Alcohol use: Never    Drug use: Never    Sexual activity: Never     Review of Systems   Constitutional:  Negative for activity change, appetite change, chills, diaphoresis, fatigue, fever and unexpected weight change.   HENT:  Negative for congestion, dental problem, ear pain, facial swelling, hearing loss, mouth sores, postnasal drip, rhinorrhea, sinus pressure, sinus pain and trouble swallowing.    Eyes:  Negative for photophobia, discharge, redness and itching.   Respiratory:  Negative for apnea, cough, chest tightness, shortness of breath and wheezing.    Cardiovascular:  Negative for chest pain and leg swelling.   Gastrointestinal:  Negative for abdominal distention, abdominal pain, blood in stool, constipation, diarrhea, nausea and vomiting.   Endocrine: Negative for cold intolerance, heat intolerance, polydipsia, polyphagia and polyuria.    Genitourinary:  Negative for decreased urine volume, difficulty urinating, dysuria, enuresis, flank pain, frequency, hematuria, pelvic pain and urgency.   Musculoskeletal:  Positive for arthralgias and joint swelling. Negative for back pain, gait problem, myalgias, neck pain and neck stiffness.   Skin:  Negative for color change, rash and wound.   Allergic/Immunologic: Negative for environmental allergies, food allergies and immunocompromised state.   Neurological:  Negative for dizziness, tremors, seizures, syncope, facial asymmetry, speech difficulty, weakness, light-headedness, numbness and headaches.   Hematological:  Negative for adenopathy. Does not bruise/bleed easily.   Psychiatric/Behavioral:  Negative for agitation, behavioral problems, confusion, decreased concentration, dysphoric mood, hallucinations, self-injury, sleep disturbance and suicidal ideas. The patient is not nervous/anxious and is not hyperactive.      Objective:     Vital Signs (Most Recent):  Temp: 97.7 °F (36.5 °C) (11/04/24 0723)  Pulse: 62 (11/04/24 0723)  Resp: 20 (11/04/24 0723)  BP: (!) 150/63 (11/04/24 0723)  SpO2: 95 % (11/04/24 0723) Vital Signs (24h Range):  Temp:  [97.7 °F (36.5 °C)-97.8 °F (36.6 °C)] 97.7 °F (36.5 °C)  Pulse:  [60-62] 62  Resp:  [18-20] 20  SpO2:  [95 %-98 %] 95 %  BP: (150-152)/(63-70) 150/63     Weight: 62 kg (136 lb 9.6 oz)  Body mass index is 25.81 kg/m².     Physical Exam  Vitals reviewed.   Constitutional:       General: She is not in acute distress.     Appearance: Normal appearance. She is normal weight.   HENT:      Head: Normocephalic and atraumatic.      Nose: Nose normal.      Mouth/Throat:      Mouth: Mucous membranes are moist.      Pharynx: Oropharynx is clear. No oropharyngeal exudate or posterior oropharyngeal erythema.   Eyes:      General: No scleral icterus.     Extraocular Movements: Extraocular movements intact.      Conjunctiva/sclera: Conjunctivae normal.      Pupils: Pupils are equal,  "round, and reactive to light.   Neck:      Vascular: No carotid bruit.   Cardiovascular:      Rate and Rhythm: Normal rate and regular rhythm.      Pulses: Normal pulses.      Heart sounds: Normal heart sounds. No murmur heard.     No gallop.   Pulmonary:      Effort: Pulmonary effort is normal.      Breath sounds: Normal breath sounds. No wheezing.   Abdominal:      General: Abdomen is flat. Bowel sounds are normal.      Palpations: Abdomen is soft.      Tenderness: There is no abdominal tenderness.   Musculoskeletal:         General: Swelling and deformity present. Normal range of motion.      Cervical back: Normal range of motion and neck supple.      Right lower leg: No edema.      Left lower leg: No edema.      Comments: Left wrist with on neurovascular is intact in fingers.   Lymphadenopathy:      Cervical: No cervical adenopathy.   Skin:     General: Skin is warm and dry.      Capillary Refill: Capillary refill takes less than 2 seconds.      Coloration: Skin is not jaundiced.      Findings: No lesion.   Neurological:      General: No focal deficit present.      Mental Status: She is alert and oriented to person, place, and time. Mental status is at baseline.      Cranial Nerves: No cranial nerve deficit.      Sensory: No sensory deficit.      Motor: No weakness.      Coordination: Coordination normal.      Gait: Gait normal.      Deep Tendon Reflexes: Reflexes normal.   Psychiatric:         Mood and Affect: Mood normal.         Behavior: Behavior normal.         Thought Content: Thought content normal.         Judgment: Judgment normal.              CRANIAL NERVES     CN III, IV, VI   Pupils are equal, round, and reactive to light.       Significant Labs: All pertinent labs within the past 24 hours have been reviewed.  CBC: No results for input(s): "WBC", "HGB", "HCT", "PLT" in the last 48 hours.  CMP: No results for input(s): "NA", "K", "CL", "CO2", "GLU", "BUN", "CREATININE", "CALCIUM", "PROT", "ALBUMIN", " ""BILITOT", "ALKPHOS", "AST", "ALT", "ANIONGAP", "EGFRNONAA" in the last 48 hours.    Invalid input(s): "ESTGFAFR[place]"    Significant Imaging: I have reviewed all pertinent imaging results/findings within the past 24 hours.  Assessment/Plan:     * Fracture of distal end of left radius and ulna, closed, initial encounter  Patient had fall in PTOT to establish that she is able to take care of herself home.  Patient is not steady at times and needs but hands and currently she is left wrist in a splint or cast.      HTN (hypertension)  Patients blood pressure range in the last 24 hours was: BP  Min: 91/49  Max: 195/80.The patient's inpatient anti-hypertensive regimen is listed below:  Current Antihypertensives  carvediloL tablet 12.5 mg, 2 times daily, Oral  furosemide tablet 20 mg, Daily, Oral    Plan  - BP is controlled, no changes needed to their regimen  -     Coronary artery disease involving coronary bypass graft  Patient with known CAD s/p stent placement and CABG, which is controlled Will continue ASA, Plavix, and Statin and monitor for S/Sx of angina/ACS. Continue to monitor on telemetry.       VTE Risk Mitigation (From admission, onward)           Ordered     IP VTE HIGH RISK PATIENT  Once         11/01/24 1619     Place sequential compression device  Until discontinued         11/01/24 1619                                    Omer Mukherjee DO  Department of Hospital Medicine  [facility] - Medical Surgical Unit          "

## 2024-11-04 NOTE — ASSESSMENT & PLAN NOTE
Patient had fall in PTOT to establish that she is able to take care of herself home.  Patient is not steady at times and needs but hands and currently she is left wrist in a splint or cast.

## 2024-11-04 NOTE — PLAN OF CARE
Problem: Occupational Therapy  Goal: Occupational Therapy Goal  Description: STG:  Pt will perform grooming with Min A  Pt will bathe self with Mod A  Pt will perform UB dressing with setup  Pt will perform LB dressing with Mod A  Pt will transfer to toilet with Min A    LTG:  (to be Met by Discharge)  Pt will perform grooming with setup  Pt will bathe self with Min A  Pt will perform UB dressing with Ind  Pt will perform LB dressing with Min A  Pt will transfer to toilet with SBA  Outcome: Progressing

## 2024-11-04 NOTE — PLAN OF CARE
Care plan reviewed  Problem: Adult Inpatient Plan of Care  Goal: Plan of Care Review  Outcome: Progressing     Problem: Adult Inpatient Plan of Care  Goal: Patient-Specific Goal (Individualized)  Outcome: Progressing     Problem: Adult Inpatient Plan of Care  Goal: Absence of Hospital-Acquired Illness or Injury  Outcome: Progressing     Problem: Wound  Goal: Optimal Functional Ability  Outcome: Progressing     Problem: Wound  Goal: Absence of Infection Signs and Symptoms  Outcome: Progressing     Problem: Adult Inpatient Plan of Care  Goal: Readiness for Transition of Care  Outcome: Progressing

## 2024-11-04 NOTE — PT/OT/SLP EVAL
Occupational Therapy   Evaluation    Name: Jayme Mcgraw  MRN: [id number]  Admitting Diagnosis: Fracture of distal end of left radius and ulna, closed, initial encounter  Recent Surgery: * No surgery found *      Recommendations:     Discharge Recommendations: Low Intensity Therapy  Discharge Equipment Recommendations:  walker, [place]  Barriers to discharge:  None    Assessment:     Jayme Mcgraw is a 89 y.o. female with a medical diagnosis of Fracture of distal end of left radius and ulna, closed, initial encounter.  She presents with the following performance deficits affecting function: weakness, impaired endurance, impaired self care skills, decreased upper extremity function, decreased ROM, impaired fine motor, orthopedic precautions.      Rehab Prognosis: Good; patient would benefit from acute skilled OT services to address these deficits and reach maximum level of function.       Plan:     Patient to be seen 5 x/week to address the above listed problems via self-care/home management, therapeutic activities, therapeutic exercises, neuromuscular re-education  Plan of Care Expires: 11/22/24  Plan of Care Reviewed with: patient    Subjective     Chief Complaint: left UE in sling and wrist in soft cast following ORIF of radius and ulna  Patient/Family Comments/goals: pt agreeable to OT eval    Occupational Profile:  Living Environment: pt lives with [facility]  Previous level of function: independent with ADLs and IADLs, including cleaning house and gardening prior to fall  Equipment Used at Home: rollator, cane, straight  Assistance upon Discharge: family    Pain/Comfort:  Pain Rating 1: 0/10    Patients cultural, spiritual, Mandaen conflicts given the current situation: no    Objective:     Communicated with: RN prior to session.  Patient found supine with   upon OT entry to room.    General Precautions: Standard, fall  Orthopedic Precautions:  NWB on LUE  Braces: UE Sling  Respiratory Status: Room  "air    Occupational Performance:    Bed Mobility:    Patient completed Rolling/Turning to Left with  moderate assistance  Patient completed Rolling/Turning to Right with stand by assistance  Patient completed Supine to Sit with contact guard assistance  Patient completed Sit to Supine with minimum assistance    Functional Mobility/Transfers:  Patient completed Sit <> Stand Transfer with minimum assistance  with  hemiwalker   Functional Mobility: see PT eval    Physical Exam:  Upper Extremity Range of Motion:     -       Right Upper Extremity: WFL  -       Left Upper Extremity: WFL except forearm, wrist, fingers  Upper Extremity Strength:    -       Right Upper Extremity: 4-/5  -       Left Upper Extremity: Deficits: forearm, wrist, fingers   Strength:    -       Right Upper Extremity: 4-/5  -       Left Upper Extremity: Deficits: 2-/5    AMPAC 6 Click ADL:  [facility] Total Score: 13    AM-PAC 6 CLICK ADL   How much help from another person does this patient currently need?   1 = Unable, Total/Dependent Assistance  2 = A lot, Maximum/Moderate Assistance  3 = A little, Minimum/Contact Guard/Supervision  4 = None, Modified [place]/Independent     Putting on and taking off regular lower body clothing? : 2  Bathing (including washing, rinsing, drying)?: 2  Toileting, which includes using toilet, bedpan, or urinal? : 2  Putting on and taking off regular upper body clothing?: 2  Taking care of personal grooming such as brushing teeth?: 2  Eating meals?: 3  Daily Activity Total Score: 13     AM-PAC Raw Score CMS "G-Code Modifier Level of Impairment Assistance   6 % Total / Unable   7 - 8 CM 80 - 100% Maximal Assist   9-13 CL 60 - 80% Moderate Assist   14 - 19 CK 40 - 60% Moderate Assist   20 - 22 CJ 20 - 40% Minimal Assist   23 CI 1-20% SBA / CGA   24 CH 0% Independent/ Mod I       Treatment & Education:  Pt educated on OT role/POC.   Importance of OOB activity with staff assistance.  Importance of sitting up in " the chair throughout the day as tolerated, especially for meals   Safety during functional t/f and mobility  Importance of assisting with self-care activities        Patient left supine with call button in reach    GOALS:   Multidisciplinary Problems       Occupational Therapy Goals          Problem: Occupational Therapy    Goal Priority Disciplines Outcome Interventions   Occupational Therapy Goal     OT, PT/OT Progressing    Description: STG:  Pt will perform grooming with Min A  Pt will bathe self with Mod A  Pt will perform UB dressing with setup  Pt will perform LB dressing with Mod A  Pt will transfer to toilet with Min A    LTG:  (to be Met by Discharge)  Pt will perform grooming with setup  Pt will bathe self with Min A  Pt will perform UB dressing with Ind  Pt will perform LB dressing with Min A  Pt will transfer to toilet with SBA                       History:     Past Medical History:   Diagnosis Date    Anticoagulant long-term use     plalvix    CHF (congestive heart failure)     Diabetes mellitus     High cholesterol     being treated with ezetimibe and rosuvastatin    Hypertension          Past Surgical History:   Procedure Laterality Date    CORONARY ARTERY BYPASS GRAFT      twice    DEBRIDEMENT, SKIN, FASCIA, MUSCLE, AND BONE, OPEN FRACTURE SITE Left 10/28/2024    Procedure: DEBRIDEMENT, SKIN, FASCIA, MUSCLE, AND BONE, OPEN FRACTURE SITE;  Surgeon: Nabil Mckinley MD;  Location: [facility];  Service: Orthopedics;  Laterality: Left;    HYSTERECTOMY      OPEN REDUCTION AND INTERNAL FIXATION (ORIF) OF FRACTURE OF RADIUS Left 10/28/2024    Procedure: ORIF, FRACTURE, RADIUS;  Surgeon: Nabil Mckinley MD;  Location: [facility] OR;  Service: Orthopedics;  Laterality: Left;       Time Tracking:     OT Date of Treatment: 11/04/24  OT Start Time: 1007  OT Stop Time: 1023  OT Total Time (min): 16 min    Billable Minutes:Evaluation 16    11/4/2024

## 2024-11-04 NOTE — SUBJECTIVE & OBJECTIVE
Past Medical History:   Diagnosis Date    Anticoagulant long-term use     plalvix    CHF (congestive heart failure)     Diabetes mellitus     High cholesterol     being treated with ezetimibe and rosuvastatin    Hypertension        Past Surgical History:   Procedure Laterality Date    CORONARY ARTERY BYPASS GRAFT      twice    DEBRIDEMENT, SKIN, FASCIA, MUSCLE, AND BONE, OPEN FRACTURE SITE Left 10/28/2024    Procedure: DEBRIDEMENT, SKIN, FASCIA, MUSCLE, AND BONE, OPEN FRACTURE SITE;  Surgeon: Nabil Mckinley MD;  Location: [facility];  Service: Orthopedics;  Laterality: Left;    HYSTERECTOMY      OPEN REDUCTION AND INTERNAL FIXATION (ORIF) OF FRACTURE OF RADIUS Left 10/28/2024    Procedure: ORIF, FRACTURE, RADIUS;  Surgeon: Nabil Mckinley MD;  Location: [facility];  Service: Orthopedics;  Laterality: Left;       Review of patient's allergies indicates:  No Known Allergies    No current facility-administered medications on file prior to encounter.     Current Outpatient Medications on File Prior to Encounter   Medication Sig    aspirin (ECOTRIN) 81 MG EC tablet Take 81 mg by mouth once daily.    bisacodyL (DULCOLAX) 5 mg EC tablet Take 5 mg by mouth daily as needed for Constipation.    carvediloL (COREG) 12.5 MG tablet Take 12.5 mg by mouth 2 (two) times daily.    clopidogreL (PLAVIX) 75 mg tablet Take 75 mg by mouth once daily.    ezetimibe (ZETIA) 10 mg tablet Take 10 mg by mouth once daily.    furosemide (LASIX) 20 MG tablet Take 20 mg by mouth once daily.    hydrALAZINE (APRESOLINE) 25 MG tablet Take 25 mg by mouth 3 (three) times daily.    NOVOLOG MIX 70-30FLEXPEN U-100 100 unit/mL (70-30) InPn pen Inject into the skin.   Inject ten Units under the skin in the morning and ten Units in the evening. Inject with meals.    potassium chloride (MICRO-K) 10 MEQ CpSR Take 10 mEq by mouth once daily.    rosuvastatin (CRESTOR) 40 MG Tab Take 20 mg by mouth every evening.     Family History    None        Tobacco Use    Smoking status: Never    Smokeless tobacco: Never   Substance and Sexual Activity    Alcohol use: Never    Drug use: Never    Sexual activity: Never     Review of Systems   Constitutional:  Negative for activity change, appetite change, chills, diaphoresis, fatigue, fever and unexpected weight change.   HENT:  Negative for congestion, dental problem, ear pain, facial swelling, hearing loss, mouth sores, postnasal drip, rhinorrhea, sinus pressure, sinus pain and trouble swallowing.    Eyes:  Negative for photophobia, discharge, redness and itching.   Respiratory:  Negative for apnea, cough, chest tightness, shortness of breath and wheezing.    Cardiovascular:  Negative for chest pain and leg swelling.   Gastrointestinal:  Negative for abdominal distention, abdominal pain, blood in stool, constipation, diarrhea, nausea and vomiting.   Endocrine: Negative for cold intolerance, heat intolerance, polydipsia, polyphagia and polyuria.   Genitourinary:  Negative for decreased urine volume, difficulty urinating, dysuria, enuresis, flank pain, frequency, hematuria, pelvic pain and urgency.   Musculoskeletal:  Positive for arthralgias and joint swelling. Negative for back pain, gait problem, myalgias, neck pain and neck stiffness.   Skin:  Negative for color change, rash and wound.   Allergic/Immunologic: Negative for environmental allergies, food allergies and immunocompromised state.   Neurological:  Negative for dizziness, tremors, seizures, syncope, facial asymmetry, speech difficulty, weakness, light-headedness, numbness and headaches.   Hematological:  Negative for adenopathy. Does not bruise/bleed easily.   Psychiatric/Behavioral:  Negative for agitation, behavioral problems, confusion, decreased concentration, dysphoric mood, hallucinations, self-injury, sleep disturbance and suicidal ideas. The patient is not nervous/anxious and is not hyperactive.      Objective:     Vital Signs (Most  Recent):  Temp: 97.7 °F (36.5 °C) (11/04/24 0723)  Pulse: 62 (11/04/24 0723)  Resp: 20 (11/04/24 0723)  BP: (!) 150/63 (11/04/24 0723)  SpO2: 95 % (11/04/24 0723) Vital Signs (24h Range):  Temp:  [97.7 °F (36.5 °C)-97.8 °F (36.6 °C)] 97.7 °F (36.5 °C)  Pulse:  [60-62] 62  Resp:  [18-20] 20  SpO2:  [95 %-98 %] 95 %  BP: (150-152)/(63-70) 150/63     Weight: 62 kg (136 lb 9.6 oz)  Body mass index is 25.81 kg/m².     Physical Exam  Vitals reviewed.   Constitutional:       General: She is not in acute distress.     Appearance: Normal appearance. She is normal weight.   HENT:      Head: Normocephalic and atraumatic.      Nose: Nose normal.      Mouth/Throat:      Mouth: Mucous membranes are moist.      Pharynx: Oropharynx is clear. No oropharyngeal exudate or posterior oropharyngeal erythema.   Eyes:      General: No scleral icterus.     Extraocular Movements: Extraocular movements intact.      Conjunctiva/sclera: Conjunctivae normal.      Pupils: Pupils are equal, round, and reactive to light.   Neck:      Vascular: No carotid bruit.   Cardiovascular:      Rate and Rhythm: Normal rate and regular rhythm.      Pulses: Normal pulses.      Heart sounds: Normal heart sounds. No murmur heard.     No gallop.   Pulmonary:      Effort: Pulmonary effort is normal.      Breath sounds: Normal breath sounds. No wheezing.   Abdominal:      General: Abdomen is flat. Bowel sounds are normal.      Palpations: Abdomen is soft.      Tenderness: There is no abdominal tenderness.   Musculoskeletal:         General: Swelling and deformity present. Normal range of motion.      Cervical back: Normal range of motion and neck supple.      Right lower leg: No edema.      Left lower leg: No edema.      Comments: Left wrist with on neurovascular is intact in fingers.   Lymphadenopathy:      Cervical: No cervical adenopathy.   Skin:     General: Skin is warm and dry.      Capillary Refill: Capillary refill takes less than 2 seconds.      Coloration:  "Skin is not jaundiced.      Findings: No lesion.   Neurological:      General: No focal deficit present.      Mental Status: She is alert and oriented to person, place, and time. Mental status is at baseline.      Cranial Nerves: No cranial nerve deficit.      Sensory: No sensory deficit.      Motor: No weakness.      Coordination: Coordination normal.      Gait: Gait normal.      Deep Tendon Reflexes: Reflexes normal.   Psychiatric:         Mood and Affect: Mood normal.         Behavior: Behavior normal.         Thought Content: Thought content normal.         Judgment: Judgment normal.              CRANIAL NERVES     CN III, IV, VI   Pupils are equal, round, and reactive to light.       Significant Labs: All pertinent labs within the past 24 hours have been reviewed.  CBC: No results for input(s): "WBC", "HGB", "HCT", "PLT" in the last 48 hours.  CMP: No results for input(s): "NA", "K", "CL", "CO2", "GLU", "BUN", "CREATININE", "CALCIUM", "PROT", "ALBUMIN", "BILITOT", "ALKPHOS", "AST", "ALT", "ANIONGAP", "EGFRNONAA" in the last 48 hours.    Invalid input(s): "ESTGFAFRICA"    Significant Imaging: I have reviewed all pertinent imaging results/findings within the past 24 hours.  "

## 2024-11-04 NOTE — ASSESSMENT & PLAN NOTE
Patients blood pressure range in the last 24 hours was: BP  Min: 91/49  Max: 195/80.The patient's inpatient anti-hypertensive regimen is listed below:  Current Antihypertensives  carvediloL tablet 12.5 mg, 2 times daily, Oral  furosemide tablet 20 mg, Daily, Oral    Plan  - BP is controlled, no changes needed to their regimen  -

## 2024-11-04 NOTE — PLAN OF CARE
Patient is awake and alert sitting up in a chair.  HR 60, RR 18, o2 sat. 98% on room air with clear bilateral breath sounds.    Problem: Gas Exchange Impaired  Goal: Optimal Gas Exchange  Outcome: Progressing  Intervention: Optimize Oxygenation and Ventilation  Flowsheets (Taken 11/4/2024 0749)  Airway/Ventilation Management:   airway patency maintained   calming measures promoted   pulmonary hygiene promoted  Head of Bed (HOB) Positioning: HOB at 20-30 degrees

## 2024-11-04 NOTE — PLAN OF CARE
Problem: Physical Therapy  Goal: Physical Therapy Goal  Description: Short Term Goals to be met by: 24    Patient will increase functional independence with mobility by performin. Supine to sit with Stand by assist  2. Sit to stand transfer with contact guard assist using Roderick-walker  3. Bed to chair transfer with contact guard assist using Roderick-walker  4. Gait  x 100 feet with contact guard assist using lowest level of assistive device  5. Lower extremity exercise program x30 reps per handout, with assistance as needed    Long Term Goals to be met by: 24    Pt will regain full independent functional mobility with lowest level of assistive device to return to home situation and prior activities of daily living.   Outcome: Progressing

## 2024-11-04 NOTE — PLAN OF CARE
[facility] - Medical Surgical Unit  Discharge Reassessment    Primary Care Provider: Armaan Burch MD    Expected Discharge Date:     Reassessment (most recent)       Discharge Reassessment - 11/04/24 1110          Discharge Reassessment    Assessment Type Discharge Planning Assessment     Did the patient's condition or plan change since previous assessment? No     Discharge Plan discussed with: Adult children     Communicated DEON with patient/caregiver Date not available/Unable to determine     Discharge Plan A Home with family;Home Health     DME Needed Upon Discharge  [place]     Transition of Care Barriers None     Why the patient remains in the hospital Requires continued medical care        Post-Acute Status    Post-Acute Authorization Home Health     Coverage medicare     Patient choice form signed by patient/caregiver List with quality metrics by geographic area provided     Discharge Delays None known at this time                   Cont to follow progress with therapy , pts discharge plan is home with daughter , she divieds time betweeen daughter and granddaughter's home . And [facility]  She uses Dr Vickers in [place] .

## 2024-11-04 NOTE — PLAN OF CARE
Problem: Adult Inpatient Plan of Care  Goal: Plan of Care Review  Outcome: Progressing  Flowsheets (Taken 11/4/2024 0559)  Plan of Care Reviewed With: patient     Problem: Diabetes Comorbidity  Goal: Blood Glucose Level Within Targeted Range  Outcome: Progressing  Intervention: Monitor and Manage Glycemia  Flowsheets (Taken 11/4/2024 0559)  Glycemic Management: blood glucose monitored     Problem: Wound  Goal: Optimal Coping  Outcome: Progressing  Intervention: Support Patient and Family Response  Flowsheets (Taken 11/4/2024 0559)  Supportive Measures: active listening utilized

## 2024-11-04 NOTE — HPI
.  Patient is a 89-year-old who fell injured her wrist is noted to be broken surgical repair of the fracture.  Patient had been doing her activities daily living prior to that.  She does have house.  She was transferred here rehab.  Patient's states that she wants to go home.  Patient has had no acute event i.e. seizure or other event stroke cause the fall.  Patient has been very active at home.  She was admitted for rehabilitation

## 2024-11-05 LAB
GLUCOSE SERPL-MCNC: 199 MG/DL (ref 70–105)
GLUCOSE SERPL-MCNC: 200 MG/DL (ref 70–105)
GLUCOSE SERPL-MCNC: 207 MG/DL (ref 70–105)
GLUCOSE SERPL-MCNC: 304 MG/DL (ref 70–105)

## 2024-11-05 PROCEDURE — 97116 GAIT TRAINING THERAPY: CPT | Mod: CQ

## 2024-11-05 PROCEDURE — 27000982 HC MATTRESS, MATRIX LAL RENTAL

## 2024-11-05 PROCEDURE — 97110 THERAPEUTIC EXERCISES: CPT | Mod: CQ

## 2024-11-05 PROCEDURE — 94761 N-INVAS EAR/PLS OXIMETRY MLT: CPT

## 2024-11-05 PROCEDURE — 63600175 PHARM REV CODE 636 W HCPCS: Performed by: FAMILY MEDICINE

## 2024-11-05 PROCEDURE — 27000958

## 2024-11-05 PROCEDURE — 97530 THERAPEUTIC ACTIVITIES: CPT

## 2024-11-05 PROCEDURE — 97110 THERAPEUTIC EXERCISES: CPT

## 2024-11-05 PROCEDURE — 11000004 HC SNF PRIVATE

## 2024-11-05 PROCEDURE — 63600175 PHARM REV CODE 636 W HCPCS: Performed by: NURSE PRACTITIONER

## 2024-11-05 PROCEDURE — 25000003 PHARM REV CODE 250: Performed by: NURSE PRACTITIONER

## 2024-11-05 PROCEDURE — 82962 GLUCOSE BLOOD TEST: CPT

## 2024-11-05 RX ADMIN — MUPIROCIN: 20 OINTMENT TOPICAL at 08:11

## 2024-11-05 RX ADMIN — Medication 6 MG: at 01:11

## 2024-11-05 RX ADMIN — ATORVASTATIN CALCIUM 40 MG: 40 TABLET, FILM COATED ORAL at 08:11

## 2024-11-05 RX ADMIN — CARVEDILOL 12.5 MG: 6.25 TABLET, FILM COATED ORAL at 08:11

## 2024-11-05 RX ADMIN — SENNOSIDES AND DOCUSATE SODIUM 1 TABLET: 50; 8.6 TABLET ORAL at 08:11

## 2024-11-05 RX ADMIN — FUROSEMIDE 20 MG: 20 TABLET ORAL at 08:11

## 2024-11-05 RX ADMIN — INSULIN ASPART 10 UNITS: 100 INJECTION, SUSPENSION SUBCUTANEOUS at 04:11

## 2024-11-05 RX ADMIN — CLOPIDOGREL BISULFATE 75 MG: 75 TABLET ORAL at 08:11

## 2024-11-05 RX ADMIN — INSULIN ASPART 10 UNITS: 100 INJECTION, SUSPENSION SUBCUTANEOUS at 08:11

## 2024-11-05 RX ADMIN — INSULIN ASPART 4 UNITS: 100 INJECTION, SOLUTION INTRAVENOUS; SUBCUTANEOUS at 04:11

## 2024-11-05 RX ADMIN — ASPIRIN 81 MG: 81 TABLET, COATED ORAL at 08:11

## 2024-11-05 RX ADMIN — Medication 6 MG: at 10:11

## 2024-11-05 NOTE — PT/OT/SLP PROGRESS
Occupational Therapy   Treatment    Name: Jayme Mcgraw  MRN: [id number]  Admitting Diagnosis:  Fracture of distal end of left radius and ulna, closed, initial encounter       Recommendations:     Discharge Recommendations: Low Intensity Therapy  Discharge Equipment Recommendations:  walker, [place]  Barriers to discharge:       Assessment:     Jayme Mcgraw is a 89 y.o. female with a medical diagnosis of Fracture of distal end of left radius and ulna, closed, initial encounter.  She presents with weakness. Performance deficits affecting function are weakness, impaired endurance, impaired self care skills, decreased upper extremity function, decreased ROM, impaired fine motor, orthopedic precautions.     Rehab Prognosis:  Good; patient would benefit from acute skilled OT services to address these deficits and reach maximum level of function.       Plan:     Patient to be seen 5 x/week to address the above listed problems via self-care/home management, therapeutic activities, therapeutic exercises, neuromuscular re-education  Plan of Care Expires: 11/22/24  Plan of Care Reviewed with: patient    Subjective     Chief Complaint: Pt had no complaints  Patient/Family Comments/goals: return home  Pain/Comfort:       Objective:     Communicated with: nursing prior to session.  Patient found HOB elevated with   upon OT entry to room.    General Precautions: Standard, fall    Orthopedic Precautions:   Braces: UE Sling  Respiratory Status: Room air     Occupational Performance:     Bed Mobility:    Patient completed Supine to Sit with supervision     Functional Mobility/Transfers:  Patient completed Sit <> Stand Transfer with supervision  with  no assistive device   Patient completed Bed <> Chair Transfer using Step Transfer technique with stand by assistance with no assistive device  Functional Mobility: Pt took 4-5 steps bed>chair    Activities of Daily Living:        [facility] 6 Click ADL:      Treatment & Education:  Pt completed  RUE elbow flex, chest press, and sh flex with 1# wt and tricep press with red Tband 3x15 ea ex to increase strength. AROM with LUE in elbow flex and sh retraction and digit flex/ext to assist functional mobility     Patient left up in chair with  PTA present    GOALS:   Multidisciplinary Problems       Occupational Therapy Goals          Problem: Occupational Therapy    Goal Priority Disciplines Outcome Interventions   Occupational Therapy Goal     OT, PT/OT Progressing    Description: STG:  Pt will perform grooming with Min A  Pt will bathe self with Mod A  Pt will perform UB dressing with setup  Pt will perform LB dressing with Mod A  Pt will transfer to toilet with Min A    LTG:  (to be Met by Discharge)  Pt will perform grooming with setup  Pt will bathe self with Min A  Pt will perform UB dressing with Ind  Pt will perform LB dressing with Min A  Pt will transfer to toilet with SBA                       Time Tracking:     OT Date of Treatment: 11/05/24  OT Start Time: 1210  OT Stop Time: 1235  OT Total Time (min): 25 min    Billable Minutes:Therapeutic Activity 10  Therapeutic Exercise 25               11/5/2024

## 2024-11-05 NOTE — PLAN OF CARE
Patient is sleeping but responds to pain.  HR 54, RR 18, o2 sat. 95% on room air with clear bilateral breath sounds.    Problem: Gas Exchange Impaired  Goal: Optimal Gas Exchange  Outcome: Progressing  Intervention: Optimize Oxygenation and Ventilation  Flowsheets (Taken 11/5/2024 7314)  Airway/Ventilation Management:   airway patency maintained   calming measures promoted   pulmonary hygiene promoted  Head of Bed (HOB) Positioning: HOB at 20-30 degrees

## 2024-11-05 NOTE — NURSING
Pt remains awake, getting up [id number] to void. Assisted to toilet and back to bed and positioned for comfort.

## 2024-11-05 NOTE — PLAN OF CARE
Problem: Adult Inpatient Plan of Care  Goal: Plan of Care Review  Outcome: Progressing     Problem: Adult Inpatient Plan of Care  Goal: Patient-Specific Goal (Individualized)  Outcome: Progressing     Problem: Adult Inpatient Plan of Care  Goal: Absence of Hospital-Acquired Illness or Injury  Outcome: Progressing     Problem: Adult Inpatient Plan of Care  Goal: Optimal Comfort and Wellbeing  Outcome: Progressing     Problem: Wound  Goal: Optimal Coping  Outcome: Progressing

## 2024-11-05 NOTE — CONSULTS
[facility]  Adult Nutrition  Consult Note         Reason for Assessment  Reason For Assessment: RD follow-up   Nutrition Risk Screen: difficulty chewing/swallowing    Assessment and Plan  11/05/2024 RD Follow up: Patient is ordered a dysphagia pureed consistent carbohydrate diet. Po intakes 25-50%. Patient reports she is eating a little better since admission. Family reports patient with poor dentition and trouble chewing foods.     Recommend to continue diet as tolerated. Honor dietary preferences. RD Following.     Consult received and appreciated. Consult to assess/educate regarding nutritional needs. Patient is a 89yo female admitted from [facility] for continued rehab.     Patient is 64.4kg with a BMI of 26.83. She is ordered a 2000kcal Consistent Carbohydrate diet with chopped meats. No intake has been documented at this time. Recommend continue current diet as tolerated. Encourage good PO intakes.     Last BM 10/31 per flowsheet.     Medications/labs reviewed. RD following.       Learning Needs/Social Determinants of Health  Learning Assessment       11/01/2024 1600 [facility] (11/1/2024 - Present)   Created by Emily Marques, RN - RN (Nurse) Status: Complete                 PRIMARY LEARNER     Primary Learner Name:  peña tidwell YT - 11/01/2024 1600    Relationship:  Patient YT - 11/01/2024 1600    Does the primary learner have any barriers to learning?:  No Barriers YT - 11/01/2024 1600    What is the preferred language of the primary learner?:  English YT - 11/01/2024 1600    Is an  required?:  No YT - 11/01/2024 1600    How does the primary learner prefer to learn new concepts?:  Listening YT - 11/01/2024 1600    How often do you need to have someone help you read instructions, pamphlets, or written material from your doctor or pharmacy?:  Rarely YT - 11/01/2024 1600        CO-LEARNER #1     No question answered         CO-LEARNER #2     No question answered        SPECIAL TOPICS     No question answered        ANSWERED BY:     No question answered        Comments         Edit History       Emily Marques, RN - RN (Nurse)   11/01/2024 1600                           Social Drivers of Health     Tobacco Use: Low Risk  (11/4/2024)    Patient History     Smoking Tobacco Use: Never     Smokeless Tobacco Use: Never     Passive Exposure: Not on file   Alcohol Use: Not At Risk (10/28/2024)    AUDIT-C     Frequency of Alcohol Consumption: Never     Average Number of Drinks: Patient does not drink     Frequency of Binge Drinking: Never   Financial Resource Strain: Low Risk  (11/4/2024)    Overall Financial Resource Strain (CARDIA)     Difficulty of Paying Living Expenses: Not very hard   Food Insecurity: No Food Insecurity (11/4/2024)    Hunger Vital Sign     Worried About Running Out of Food in the Last Year: Never true     Ran Out of Food in the Last Year: Never true   Transportation Needs: No Transportation Needs (11/4/2024)    TRANSPORTATION NEEDS     Transportation : No   Physical Activity: Inactive (10/28/2024)    Exercise Vital Sign     Days of Exercise per Week: 0 days     Minutes of Exercise per Session: 0 min   Stress: No Stress Concern Present (11/4/2024)    [employer] - Occupational Stress Questionnaire     Feeling of Stress : Only a little   Housing Stability: Low Risk  (11/4/2024)    Housing Stability Vital Sign     Unable to Pay for Housing in the Last Year: No     Homeless in the Last Year: No   Depression: Not at risk (9/16/2024)    Received from [facility]    PHQ-2     PHQ-2 Score: 0   Utilities: Not At Risk (11/4/2024)    [facility] Utilities     Threatened with loss of utilities: No   Health Literacy: Inadequate Health Literacy (11/4/2024)     Health Literacy     Frequency of need for help with medical instructions: Sometimes   Social Isolation: Moderately  Integrated (10/28/2024)    Social Isolation     Social Isolation: 2            Malnutrition  Is Patient Malnourished: No    Nutrition Diagnosis  Altered nutrition related laboratory values related to Diabetes Mellitus as evidenced by elevated BG, elevated HbA1c  Comments: continue consistent carbohydrate diet    Recent Labs   Lab 11/05/24  1053   POCGLU 199*     Comments on Glucose: Glucose elevated. PMH DM2.     Nutrition Prescription / Recommendations  Recommendation/Intervention: Recommend continue current diet as tolerated. Encourage good PO intakes.  Goals: Weight maintenance during admission, intake 50-75% of meals during admission  Nutrition Goal Status: progressing towards goal  Communication of RD Recs: discussed on rounds  Current Diet Order: 2000 calorie consistent carbohydrate pureed  Oral Nutrition Supplement: Boost plus all meals  Chewing or Swallowing Difficulty?: Edentulous : no dentures present  Recommended Diet: Consistent Carbohydrate 2000 (75g Carbs)  Recommended Oral Supplement: No Oral Supplements  Is Nutrition Support Recommended: [facility] Nutrition Support: No  Is Nutrition Education Recommended: No    Monitor and Evaluation  % current Intake: P.O. intake of 25 - 50 %  % intake to meet estimated needs: 50 - 75 %  Food and Nutrient Intake: food and beverage intake  Food and Nutrient Adminstration: diet order  Anthropometric Measurements: weight, weight change  Biochemical Data, Medical Tests and Procedures: electrolyte and renal panel, gastrointestinal profile, glucose/endocrine profile, inflammatory profile, lipid profile  Nutrition-Focused Physical Findings: overall appearance  Tolerance: tolerating    Current Medical Diagnosis and Past Medical History  Diagnosis: other (see comments)  Past Medical History:   Diagnosis Date    Anticoagulant long-term use     plalvix    CHF (congestive heart failure)     Diabetes mellitus     High cholesterol     being treated with ezetimibe and  "rosuvastatin    Hypertension        Nutrition/Diet History  Spiritual, Cultural Beliefs, Episcopal Practices, Values that Affect Care: no  Factors Affecting Nutritional Intake: decreased appetite, chewing difficulties/inability to chew food    Lab/Procedures/Meds  No results for input(s): "NA", "K", "BUN", "CREATININE", "CALCIUM", "ALBUMIN", "CL", "ALT", "AST", "PHOS" in the last 72 hours.  Note: K+ low. Cr elevated. Alb low    Last A1c:   Lab Results   Component Value Date    HGBA1C 7.7 (H) 10/28/2024   Note: HbA1c elevated. PMH DM2.     Lab Results   Component Value Date    RBC 3.44 (L) 11/01/2024    HGB 10.0 (L) 11/01/2024    HCT 31.3 (L) 11/01/2024    MCV 91.0 11/01/2024    MCH 29.1 11/01/2024    MCHC 31.9 (L) 11/01/2024   Note: H&H low    Pertinent Labs Reviewed: reviewed  Pertinent Medications Reviewed: reviewed  Scheduled Meds:   aspirin  81 mg Oral Daily    atorvastatin  40 mg Oral QHS    carvediloL  12.5 mg Oral BID    clopidogreL  75 mg Oral Daily    furosemide  20 mg Oral Daily    insulin asp prt-insulin aspart (NovoLOG 70/30)  10 Units Subcutaneous BIDWM    mupirocin   Nasal BID    senna-docusate 8.6-50 mg  1 tablet Oral BID     Continuous Infusions:  PRN Meds:.  Current Facility-Administered Medications:     acetaminophen, 650 mg, Oral, Q4H PRN    calcium carbonate, 500 mg, Oral, BID PRN    dextrose 10%, 12.5 g, Intravenous, PRN    dextrose 10%, 25 g, Intravenous, PRN    diphenhydrAMINE, 25 mg, Intravenous, Q6H PRN    glucagon (human recombinant), 1 mg, Intramuscular, PRN    glucose, 16 g, Oral, PRN    glucose, 24 g, Oral, PRN    HYDROcodone-acetaminophen, 1 tablet, Oral, Q6H PRN    insulin aspart U-100, 0-5 Units, Subcutaneous, QID (AC + HS) PRN    melatonin, 6 mg, Oral, Nightly PRN    morphine, 2 mg, Intravenous, Q4H PRN    naloxone, 0.02 mg, Intravenous, PRN    ondansetron, 4 mg, Intravenous, Q8H PRN    ondansetron, 4 mg, Intravenous, Daily PRN    sodium chloride 0.9%, 10 mL, Intravenous, Q12H " "PRN    Anthropometrics  Temp: 97.6 °F (36.4 °C)  Height: 5' 1" (154.9 cm)  Height (inches): 61 in  Weight Method: Standard Scale  Weight: 62.7 kg (138 lb 3.2 oz)  Weight (lb): 138.2 lb  Ideal Body Weight (IBW), Female: 105 lb  % Ideal Body Weight, Female (lb): 135.24 %  BMI (Calculated): 26.1       Estimated/Assessed Needs  RMR ([place]-St. Jeor Equation): 964.25     Temp: 97.6 °F (36.4 °C)Axillary  Weight Used For Calorie Calculations: 64.4 kg (141 lb 15.6 oz)     Energy Calorie Requirements (kcal): 1610-1932kcal (25-30kcal/kg)  Weight Used For Protein Calculations: 64.4 kg (141 lb 15.6 oz)  Protein Requirements: 65-77g (1.0-1.2g/kg)       RDA Method (mL): 1610       Nutrition by Nursing  Diet/Nutrition Received: mechanical/dental soft  Intake (%): 50%  Diet/Feeding Assistance: tray set-up     Last Bowel Movement: 11/04/24                Nutrition Follow-Up  RD Follow-up?: Yes      Nutrition Discharge Planning: RD following for discharge needs          Available via Secure Chat  "

## 2024-11-05 NOTE — PLAN OF CARE
Weekly Swing Bed Note    Patient's sat this morning was 95% on room air.  BBS clear.   No other Respiratory services identified at this time.

## 2024-11-05 NOTE — PT/OT/SLP PROGRESS
Physical Therapy Treatment    Patient Name:  Jayme Mcgraw   MRN:  [id number]    Recommendations:     Discharge Recommendations: Low Intensity Therapy  Discharge Equipment Recommendations: walker, [place]  Barriers to discharge: Inaccessible home and Decreased caregiver support    Assessment:     Jayme Mcgraw is a 89 y.o. female admitted with a medical diagnosis of Fracture of distal end of left radius and ulna, closed, initial encounter.  She presents with the following impairments/functional limitations: weakness, gait instability, decreased upper extremity function, decreased lower extremity function, impaired balance, impaired functional mobility.    Rehab Prognosis: Good; patient would benefit from acute skilled PT services to address these deficits and reach maximum level of function.    Recent Surgery: * No surgery found *      Plan:     During this hospitalization, patient to be seen 5 x/week to address the identified rehab impairments via gait training, therapeutic activities, therapeutic exercises and progress toward the following goals:    Plan of Care Expires:  11/30/24    Subjective     Chief Complaint: n/a  Patient/Family Comments/goals: Pt. States she is well,  Pain/Comfort:  Pain Rating 1: 0/10 ate lunch, agreeable to therapy      Objective:     Communicated with Angel Márquez PT for POC prior to session.  Patient found HOB elevated with   upon PT entry to room.     General Precautions: Standard, fall  Orthopedic Precautions: LUE non weight bearing  Braces: N/A  Respiratory Status: Room air     Functional Mobility:  Bed Mobility:     Rolling Left:  contact guard assistance  Scooting: contact guard assistance and minimum assistance  Supine to Sit: contact guard assistance and minimum assistance  Transfers:     Sit to Stand:  contact guard assistance with hand-held assist  Bed to Chair: contact guard assistance with  hand-held assist  using  Step Transfer  Toilet transfer with Min A to danni pants, and get  wipe, SVN for transfer  Gait: Pt. Amb. X 78 feet with LBQC and CGA       AM-PAC 6 CLICK MOBILITY  Turning over in bed (including adjusting bedclothes, sheets and blankets)?: 3  Sitting down on and standing up from a chair with arms (e.g., wheelchair, bedside commode, etc.): 3  Moving from lying on back to sitting on the side of the bed?: 3  Moving to and from a bed to a chair (including a wheelchair)?: 3  Need to walk in hospital room?: 3  Climbing 3-5 steps with a railing?: 3  Basic Mobility Total Score: 18       Treatment & Education:  Pt. Participated in mobility per above and LE PREs in Sitting: LAQs and Marching with 1# B 2 x 10, Hip ABD/ ADD and Heel slides 2 x 10 with 1# B, Hip ABD and ADD and HS curls with red TB 2 x 15 B, Ankle all directions and toe mobility.    Patient left up in chair with call button in reach and family present..    GOALS:   Multidisciplinary Problems       Physical Therapy Goals          Problem: Physical Therapy    Goal Priority Disciplines Outcome Interventions   Physical Therapy Goal     PT, PT/OT Progressing    Description: Short Term Goals to be met by: 24    Patient will increase functional independence with mobility by performin. Supine to sit with Stand by assist  2. Sit to stand transfer with contact guard assist using Roderick-walker  3. Bed to chair transfer with contact guard assist using Roderick-walker  4. Gait  x 100 feet with contact guard assist using lowest level of assistive device  5. Lower extremity exercise program x30 reps per handout, with assistance as needed    Long Term Goals to be met by: 24    Pt will regain full independent functional mobility with lowest level of assistive device to return to home situation and prior activities of daily living.                        Time Tracking:     PT Received On: 24  PT Start Time: 1133     PT Stop Time: 1209  PT Total Time (min): 36 min     Billable Minutes: Gait Training 15 and Therapeutic Exercise  15    Treatment Type: Treatment  PT/PTA: PTA     Number of PTA visits since last PT visit: 1     11/05/2024

## 2024-11-05 NOTE — NURSING
Pt remains awake, asks if she can have something for sleep. Offered melatonin, pt took 6mg. Room darkened, SR up and CB in reach.

## 2024-11-06 LAB
GLUCOSE SERPL-MCNC: 177 MG/DL (ref 70–105)
GLUCOSE SERPL-MCNC: 234 MG/DL (ref 70–105)
GLUCOSE SERPL-MCNC: 247 MG/DL (ref 70–105)
GLUCOSE SERPL-MCNC: 253 MG/DL (ref 70–105)

## 2024-11-06 PROCEDURE — 11000004 HC SNF PRIVATE

## 2024-11-06 PROCEDURE — 27000982 HC MATTRESS, MATRIX LAL RENTAL

## 2024-11-06 PROCEDURE — 63600175 PHARM REV CODE 636 W HCPCS: Performed by: FAMILY MEDICINE

## 2024-11-06 PROCEDURE — 82962 GLUCOSE BLOOD TEST: CPT

## 2024-11-06 PROCEDURE — 97116 GAIT TRAINING THERAPY: CPT | Mod: CQ

## 2024-11-06 PROCEDURE — 63600175 PHARM REV CODE 636 W HCPCS: Performed by: NURSE PRACTITIONER

## 2024-11-06 PROCEDURE — 25000003 PHARM REV CODE 250: Performed by: NURSE PRACTITIONER

## 2024-11-06 PROCEDURE — 97110 THERAPEUTIC EXERCISES: CPT | Mod: CQ

## 2024-11-06 PROCEDURE — 97110 THERAPEUTIC EXERCISES: CPT

## 2024-11-06 PROCEDURE — 27000958

## 2024-11-06 PROCEDURE — 94761 N-INVAS EAR/PLS OXIMETRY MLT: CPT

## 2024-11-06 RX ORDER — POTASSIUM CHLORIDE 20 MEQ/1
20 TABLET, EXTENDED RELEASE ORAL DAILY
Status: DISCONTINUED | OUTPATIENT
Start: 2024-11-06 | End: 2024-11-08 | Stop reason: HOSPADM

## 2024-11-06 RX ADMIN — INSULIN ASPART 10 UNITS: 100 INJECTION, SUSPENSION SUBCUTANEOUS at 05:11

## 2024-11-06 RX ADMIN — CARVEDILOL 12.5 MG: 6.25 TABLET, FILM COATED ORAL at 08:11

## 2024-11-06 RX ADMIN — ATORVASTATIN CALCIUM 40 MG: 40 TABLET, FILM COATED ORAL at 08:11

## 2024-11-06 RX ADMIN — INSULIN ASPART 2 UNITS: 100 INJECTION, SOLUTION INTRAVENOUS; SUBCUTANEOUS at 11:11

## 2024-11-06 RX ADMIN — INSULIN ASPART 1 UNITS: 100 INJECTION, SOLUTION INTRAVENOUS; SUBCUTANEOUS at 08:11

## 2024-11-06 RX ADMIN — MUPIROCIN: 20 OINTMENT TOPICAL at 09:11

## 2024-11-06 RX ADMIN — INSULIN ASPART 10 UNITS: 100 INJECTION, SUSPENSION SUBCUTANEOUS at 07:11

## 2024-11-06 RX ADMIN — FUROSEMIDE 20 MG: 20 TABLET ORAL at 09:11

## 2024-11-06 RX ADMIN — POTASSIUM CHLORIDE 20 MEQ: 1500 TABLET, EXTENDED RELEASE ORAL at 09:11

## 2024-11-06 RX ADMIN — CARVEDILOL 12.5 MG: 6.25 TABLET, FILM COATED ORAL at 09:11

## 2024-11-06 RX ADMIN — INSULIN ASPART 2 UNITS: 100 INJECTION, SOLUTION INTRAVENOUS; SUBCUTANEOUS at 05:11

## 2024-11-06 RX ADMIN — ASPIRIN 81 MG: 81 TABLET, COATED ORAL at 09:11

## 2024-11-06 RX ADMIN — SENNOSIDES AND DOCUSATE SODIUM 1 TABLET: 50; 8.6 TABLET ORAL at 09:11

## 2024-11-06 RX ADMIN — CLOPIDOGREL BISULFATE 75 MG: 75 TABLET ORAL at 09:11

## 2024-11-06 RX ADMIN — SENNOSIDES AND DOCUSATE SODIUM 1 TABLET: 50; 8.6 TABLET ORAL at 08:11

## 2024-11-06 NOTE — PLAN OF CARE
Problem: Adult Inpatient Plan of Care  Goal: Plan of Care Review  Outcome: Progressing     Problem: Adult Inpatient Plan of Care  Goal: Patient-Specific Goal (Individualized)  Outcome: Progressing     Problem: Adult Inpatient Plan of Care  Goal: Absence of Hospital-Acquired Illness or Injury  Outcome: Progressing     Problem: Adult Inpatient Plan of Care  Goal: Optimal Comfort and Wellbeing  Outcome: Progressing     Problem: Pain Acute  Goal: Optimal Pain Control and Function  Outcome: Progressing     Problem: Fall Injury Risk  Goal: Absence of Fall and Fall-Related Injury  Outcome: Progressing

## 2024-11-06 NOTE — PLAN OF CARE
Problem: Adult Inpatient Plan of Care  Goal: Plan of Care Review  Outcome: Progressing  Goal: Patient-Specific Goal (Individualized)  Outcome: Progressing  Goal: Absence of Hospital-Acquired Illness or Injury  Outcome: Progressing  Goal: Optimal Comfort and Wellbeing  Outcome: Progressing  Goal: Readiness for Transition of Care  Outcome: Progressing     Problem: Diabetes Comorbidity  Goal: Blood Glucose Level Within Targeted Range  Outcome: Progressing     Problem: Pain Acute  Goal: Optimal Pain Control and Function  Outcome: Progressing     Problem: Fall Injury Risk  Goal: Absence of Fall and Fall-Related Injury  Outcome: Progressing     Problem: Gas Exchange Impaired  Goal: Optimal Gas Exchange  Outcome: Progressing

## 2024-11-06 NOTE — PT/OT/SLP PROGRESS
Occupational Therapy   Treatment    Name: Jayme Mcgraw  MRN: [id number]  Admitting Diagnosis:  Fracture of distal end of left radius and ulna, closed, initial encounter       Recommendations:     Discharge Recommendations: Low Intensity Therapy  Discharge Equipment Recommendations:  walker, [place]  Barriers to discharge:  None    Assessment:     Jayme Mcgraw is a 89 y.o. female with a medical diagnosis of Fracture of distal end of left radius and ulna, closed, initial encounter.  She presents with weakness. Performance deficits affecting function are weakness, impaired endurance, impaired self care skills, decreased upper extremity function, decreased ROM, impaired fine motor, orthopedic precautions.     Rehab Prognosis:  Good; patient would benefit from acute skilled OT services to address these deficits and reach maximum level of function.       Plan:     Patient to be seen 5 x/week to address the above listed problems via self-care/home management, therapeutic activities, therapeutic exercises, neuromuscular re-education  Plan of Care Expires: 11/22/24  Plan of Care Reviewed with: patient    Subjective     Chief Complaint: Pt had no complaints  Patient/Family Comments/goals: return home  Pain/Comfort:       Objective:     Communicated with: nursing prior to session.  Patient found HOB elevated with   upon OT entry to room.    General Precautions: Standard, fall    Orthopedic Precautions:   Braces: UE Sling  Respiratory Status: Room air     Occupational Performance:     Bed Mobility:    Patient completed Supine to Sit with supervision     Functional Mobility/Transfers:  Patient completed Sit <> Stand Transfer with supervision  with  no assistive device   Patient completed Bed <> Chair Transfer using Step Transfer technique with stand by assistance with no assistive device    Treatment & Education:  Pt completed RUE elbow flexion with 2# DB 2x10 reps, chest press and shld flex with 1# DB 2x10 reps and tricep press with  red Tband 2x15 ea ex to increase strength.   AROM with LUE in elbow flexion/extension and shoulder flexion and horizontal adduction 2x10 repetitions each, and digit flex/ext 2x25 repsto assist functional mobility     Patient left up in chair with  PTA present    GOALS:   Multidisciplinary Problems       Occupational Therapy Goals          Problem: Occupational Therapy    Goal Priority Disciplines Outcome Interventions   Occupational Therapy Goal     OT, PT/OT Progressing    Description: STG:  Pt will perform grooming with Min A  Pt will bathe self with Mod A  Pt will perform UB dressing with setup  Pt will perform LB dressing with Mod A  Pt will transfer to toilet with Min A    LTG:  (to be Met by Discharge)  Pt will perform grooming with setup  Pt will bathe self with Min A  Pt will perform UB dressing with Ind  Pt will perform LB dressing with Min A  Pt will transfer to toilet with SBA                       Time Tracking:     OT Date of Treatment: 11/06/24  OT Start Time: 1249  OT Stop Time: 1316  OT Total Time (min): 27 min    Billable Minutes:Therapeutic Exercise 27    OT/SHEN: OT          11/6/2024

## 2024-11-06 NOTE — PLAN OF CARE
[facility]  Discharge Final Note    Primary Care Provider: Armaan Burch MD    Expected Discharge Date:     Final Discharge Note (most recent)       Final Note - 11/06/24 1053          Final Note    Assessment Type Final Discharge Note     What phone number can be called within the next 1-3 days to see how you are doing after discharge? [phone]        Post-Acute Status    Post-Acute Authorization Home Health     Post-Acute Placement Status Patient List Provided     Coverage Medicare     Patient choice form signed by patient/caregiver List with quality metrics by geographic area provided     Discharge Delays None known at this time                     Important Message from Medicare  Important Message from Medicare regarding Discharge Appeal Rights: Given to patient/caregiver, Explained to patient/caregiver, Signed/date by patient/caregiver     Date IMM was signed: 11/05/24  Time IMM was signed: 1600    Discussed swingbed meeting with pt and daughter , recommended home with home health , pt lives with splitting time in two week intervals with daughter Soo , who is present and granddaughter in [place] , [facility] set up and nurse will see pt at both houses. Pt has all needed DME she is actually on a quad cane , Daughter wished to speak with PT and OT , who were called to the room for questioning , Soo spoke of pt being alone during times when caregivers worked , I gave her a list of sitter agencies  she also spoke about nursing home placement but declined to do so at this time .  [facility] notified of discharge date.

## 2024-11-06 NOTE — PT/OT/SLP PROGRESS
Physical Therapy Treatment    Patient Name:  Jayme Mcgraw   MRN:  [id number]    Recommendations:     Discharge Recommendations: Low Intensity Therapy  Discharge Equipment Recommendations: walker, [place]  Barriers to discharge: Inaccessible home and Decreased caregiver support    Assessment:     Jayme Mcgraw is a 89 y.o. female admitted with a medical diagnosis of Fracture of distal end of left radius and ulna, closed, initial encounter.  She presents with the following impairments/functional limitations: weakness, gait instability, decreased upper extremity function, decreased lower extremity function, impaired balance, impaired functional mobility.    Rehab Prognosis: Good; patient would benefit from acute skilled PT services to address these deficits and reach maximum level of function.    Recent Surgery: * No surgery found *      Plan:     During this hospitalization, patient to be seen 5 x/week to address the identified impairments via gait training, therapeutic activities, therapeutic exercises and progress toward the following goals:    Plan of Care Expires:  11/30/24    Subjective     Chief Complaint: UE pain  Patient/Family Comments/goals: Pt. States her hand hurts more when it hangs down.  Pain/Comfort:         Objective:     Communicated with OT prior to session.  Patient found HOB elevated with   upon PT entry to room.     General Precautions: Standard, fall  Orthopedic Precautions: LUE non weight bearing  Braces: N/A  Respiratory Status: Nasal cannula, flow 2 L/min     Functional Mobility:  Transfers:     Sit to Stand:  stand by assistance and contact guard assistance with quad cane  Stairs:  Pt ascended/descended 2 x 4 stair(s) with No Assistive Device with right handrail with Stand-by Assistance and Contact Guard Assistance.       AM-PAC 6 CLICK MOBILITY  Turning over in bed (including adjusting bedclothes, sheets and blankets)?: 3  Sitting down on and standing up from a chair with arms (e.g., wheelchair,  bedside commode, etc.): 3  Moving from lying on back to sitting on the side of the bed?: 3  Moving to and from a bed to a chair (including a wheelchair)?: 3  Need to walk in hospital room?: 3  Climbing 3-5 steps with a railing?: 3  Basic Mobility Total Score: 18       Treatment & Education:  Pt. Participated in mobility per above and LE PREs in Sitting: AP, Heel slides and Hip ABD/ ADD with knee Ext 2 x 15 with 2# cw, HS curls and Hip ABD with green TB 2x 15 reps each bilateral, LAQs and Marching 3 x 10 reps each B with 2# CW.     Patient left up in chair with call button in reach..    GOALS:   Multidisciplinary Problems       Physical Therapy Goals          Problem: Physical Therapy    Goal Priority Disciplines Outcome Interventions   Physical Therapy Goal     PT, PT/OT Progressing    Description: Short Term Goals to be met by: 24    Patient will increase functional independence with mobility by performin. Supine to sit with Stand by assist  2. Sit to stand transfer with contact guard assist using Roderick-walker  3. Bed to chair transfer with contact guard assist using Roderick-walker  4. Gait  x 100 feet with contact guard assist using lowest level of assistive device  5. Lower extremity exercise program x30 reps per handout, with assistance as needed    Long Term Goals to be met by: 24    Pt will regain full independent functional mobility with lowest level of assistive device to return to home situation and prior activities of daily living.                        Time Tracking:     PT Received On: 24  PT Start Time: 1318     PT Stop Time: 1400  PT Total Time (min): 42 min     Billable Minutes: Gait Training 12 and Therapeutic Exercise 30    Treatment Type: Treatment  PT/PTA: PTA     Number of PTA visits since last PT visit: 2     2024

## 2024-11-06 NOTE — CARE UPDATE
[facility] - Medical Surgical Unit - Swing Bed   Interdisciplinary Team Meeting    Patient: Jayme Mcgraw   Today's Date: 11/6/2024   Estimated D/C Date:         Physician: Omer Mukherjee DO Nurse Practitioner:  connie   Pharmacy:  [facility] Unit Director: Jennygwen Prado   : Kianna Souaz Physical/Occupational Therapy: Rashad Márquez   Speech Therapy:  Charleen Mccarthy Activity Therapy: connie   Nursing: Jenny Prado  Respiratory: Kesha Moyer Dietary:  Sue Oralia  Other: na     Nurse  New Symptoms/Problems: na  Last Bowel Movement: 11/05/24   Urine: continent  Barbour: No  Bowel: continent   Constipated: No  Diarrhea: No   Isolation: No  Wound Care: No  Wound Location/Tx: na  Cognition:  WNL  Aspiration Precautions: No  Comment(s): NA    Respiratory   O2 Device: Room Air  O2 Flow: 0  SpO2: 95%  Neb Tx: No  Comment(s): NA     Dietary  Nutrition:  Pureed  Comment(s): 25% intake  poor dental     Speech Therapy  Speech/Swallowing: No current speech or swallowing issues  Comment(s): connie    Physical Therapy  Gait/Assistive Device: 177 ft with quad cane  ELOS: Plan to DC     Transfers: Independent  Bed Mobility: Independent Range of Motion/Restrictions: left arm in sling  Comment(s): connie     Occupational Therapy  Eating/Grooming: Minimal Assistance Toileting: Minimal Assistance   Bathing: Moderate Assistance Dressing (Upper Body): Moderate Assistance   Dressing (Lower Body): Moderate Assistance Comment(s): pt left arm is in sling requires assistance with dressing      Pharmacy  Medication Changes (see all MD orders in chart): No  Labs Reviewed: Yes  New Lab Orders: No  Comment(s): cnonie      Tx Plan/Recommendations reviewed with family and/or patient on (date) 11/5/24.  Additional family Conference/Training: with Soo Braga pt's daughter 11/5/24  D/C Plan/Recommendations: Home with HH and Home with family  DEON:   Comment(s): connie

## 2024-11-07 LAB
GLUCOSE SERPL-MCNC: 126 MG/DL (ref 70–105)
GLUCOSE SERPL-MCNC: 255 MG/DL (ref 70–105)
GLUCOSE SERPL-MCNC: 261 MG/DL (ref 70–105)
GLUCOSE SERPL-MCNC: 280 MG/DL (ref 70–105)

## 2024-11-07 PROCEDURE — 99900035 HC TECH TIME PER 15 MIN (STAT)

## 2024-11-07 PROCEDURE — 97116 GAIT TRAINING THERAPY: CPT | Mod: CQ

## 2024-11-07 PROCEDURE — 82962 GLUCOSE BLOOD TEST: CPT

## 2024-11-07 PROCEDURE — 99316 NF DSCHRG MGMT 30 MIN+: CPT | Mod: ,,, | Performed by: FAMILY MEDICINE

## 2024-11-07 PROCEDURE — 97530 THERAPEUTIC ACTIVITIES: CPT

## 2024-11-07 PROCEDURE — 94761 N-INVAS EAR/PLS OXIMETRY MLT: CPT

## 2024-11-07 PROCEDURE — 63600175 PHARM REV CODE 636 W HCPCS: Performed by: NURSE PRACTITIONER

## 2024-11-07 PROCEDURE — 25000003 PHARM REV CODE 250: Performed by: NURSE PRACTITIONER

## 2024-11-07 PROCEDURE — 63600175 PHARM REV CODE 636 W HCPCS: Performed by: FAMILY MEDICINE

## 2024-11-07 PROCEDURE — 27000941

## 2024-11-07 PROCEDURE — 97110 THERAPEUTIC EXERCISES: CPT | Mod: CQ

## 2024-11-07 PROCEDURE — 97110 THERAPEUTIC EXERCISES: CPT

## 2024-11-07 PROCEDURE — 11000004 HC SNF PRIVATE

## 2024-11-07 RX ADMIN — FUROSEMIDE 20 MG: 20 TABLET ORAL at 09:11

## 2024-11-07 RX ADMIN — ATORVASTATIN CALCIUM 40 MG: 40 TABLET, FILM COATED ORAL at 08:11

## 2024-11-07 RX ADMIN — INSULIN ASPART 3 UNITS: 100 INJECTION, SOLUTION INTRAVENOUS; SUBCUTANEOUS at 05:11

## 2024-11-07 RX ADMIN — CLOPIDOGREL BISULFATE 75 MG: 75 TABLET ORAL at 09:11

## 2024-11-07 RX ADMIN — ACETAMINOPHEN 650 MG: 325 TABLET ORAL at 12:11

## 2024-11-07 RX ADMIN — POTASSIUM CHLORIDE 20 MEQ: 1500 TABLET, EXTENDED RELEASE ORAL at 09:11

## 2024-11-07 RX ADMIN — INSULIN ASPART 1 UNITS: 100 INJECTION, SOLUTION INTRAVENOUS; SUBCUTANEOUS at 08:11

## 2024-11-07 RX ADMIN — INSULIN ASPART 10 UNITS: 100 INJECTION, SUSPENSION SUBCUTANEOUS at 05:11

## 2024-11-07 RX ADMIN — INSULIN ASPART 10 UNITS: 100 INJECTION, SUSPENSION SUBCUTANEOUS at 07:11

## 2024-11-07 RX ADMIN — SENNOSIDES AND DOCUSATE SODIUM 1 TABLET: 50; 8.6 TABLET ORAL at 09:11

## 2024-11-07 RX ADMIN — INSULIN ASPART 3 UNITS: 100 INJECTION, SOLUTION INTRAVENOUS; SUBCUTANEOUS at 11:11

## 2024-11-07 RX ADMIN — CARVEDILOL 12.5 MG: 6.25 TABLET, FILM COATED ORAL at 09:11

## 2024-11-07 RX ADMIN — ASPIRIN 81 MG: 81 TABLET, COATED ORAL at 09:11

## 2024-11-07 RX ADMIN — ACETAMINOPHEN 650 MG: 325 TABLET ORAL at 08:11

## 2024-11-07 RX ADMIN — CARVEDILOL 12.5 MG: 6.25 TABLET, FILM COATED ORAL at 08:11

## 2024-11-07 NOTE — DISCHARGE SUMMARY
[facility] - Medical Surgical Unit  Hospital Medicine  Discharge Summary      Patient Name: Jayme Mcgraw  MRN: [id number]  MELISSA: [id number]  Patient Class: IP- Swing  Admission Date: 11/1/2024  Hospital Length of Stay: 6 days  Discharge Date and Time: 11-8-2024  Attending Physician: Qamar Pettit Jr., MD   Discharging Provider: Omer Mukherjee DO  Primary Care Provider: Armaan Burch MD    Primary Care Team: Networked reference to record PCT     HPI:   .  Patient is a 89-year-old who fell injured her wrist is noted to be broken surgical repair of the fracture.  Patient had been doing her activities daily living prior to that.  She does have house.  She was transferred here rehab.  Patient's states that she wants to go home.  Patient has had no acute event i.e. seizure or other event stroke cause the fall.  Patient has been very active at home.  She was admitted for rehabilitation    * No surgery found *      Hospital Course:   Patient admitted to the hospital for fractured distal end of her left radius and ulna and to participate in physical therapy and occupational therapy to allow her to go home she might perform her activities of daily living.  Patient has done well.  Labs have all been within normal limits.  Patient's blood sugars have been controlled.  Patient is to be followed in 1 week by her primary care doctor.  Ortho follow-up in 1 week.     Goals of Care Treatment Preferences:  Code Status: Full Code      SDOH Screening:  The patient was screened for utility difficulties, food insecurity, transport difficulties, housing insecurity, and interpersonal safety and there were no concerns identified this admission.     Consults:   Consults (From admission, onward)          Status Ordering Provider     Inpatient consult to Registered Dietitian/Nutritionist  Once        Provider:  (Not yet assigned)    Completed SERVANDO FERNANDES            Cardiac/Vascular  HTN (hypertension)  Patients blood  pressure range in the last 24 hours was: BP  Min: 91/49  Max: 195/80.The patient's inpatient anti-hypertensive regimen is listed below:  Current Antihypertensives  carvediloL tablet 12.5 mg, 2 times daily, Oral  furosemide tablet 20 mg, Daily, Oral    Plan  - BP is controlled, no changes needed to their regimen    Patient is discharged with blood pressure well controlled on her carvedilol.  -     Coronary artery disease involving coronary bypass graft  Patient with known CAD s/p stent placement and CABG, which is controlled Will continue ASA, Plavix, and Statin and monitor for S/Sx of angina/ACS. Continue to monitor on telemetry.  Patient discharged in stable condition without incident    Orthopedic  * Fracture of distal end of left radius and ulna, closed, initial encounter  Patient had fall in PTOT to establish that she is able to take care of herself home.  Patient is not steady at times and needs but hands and currently she is left wrist in a splint or cast.    Patient has done well in physical therapy and walking over 150 ft.  She is able to maintain her activities of daily living.  Discharged in stable condition.  She will have home health i.e. [facility] care        Final Active Diagnoses:    Diagnosis Date Noted POA    PRINCIPAL PROBLEM:  Fracture of distal end of left radius and ulna, closed, initial encounter [S52.502A, S52.602A] 10/30/2024 Yes    Coronary artery disease involving coronary bypass graft [I25.810] 10/27/2024 Yes    HTN (hypertension) [I10] 10/27/2024 Yes      Problems Resolved During this Admission:       Discharged Condition: stable    Disposition: Home or Self Care    Follow Up:   Follow-up Information       Armaan Burch MD Follow up in 1 week(s).    Specialty: Internal Medicine  Contact information:  [address] [place] [id number]  [phone]                           Patient Instructions:      Ambulatory referral/consult to Home Health   Standing Status: Future   Referral  "Priority: Routine Referral Type: Home Health   Referral Reason: Specialty Services Required   Requested Specialty: Home Health Services   Number of Visits Requested: 1     Diet diabetic     Activity as tolerated       Significant Diagnostic Studies: Labs: BMP: No results for input(s): "GLU", "NA", "K", "CL", "CO2", "BUN", "CREATININE", "CALCIUM", "MG" in the last 48 hours., CMP No results for input(s): "NA", "K", "CL", "CO2", "GLU", "BUN", "CREATININE", "CALCIUM", "PROT", "ALBUMIN", "BILITOT", "ALKPHOS", "AST", "ALT", "ANIONGAP", "ESTGFRAFRICA", "EGFRNONAA" in the last 48 hours., CBC No results for input(s): "WBC", "HGB", "HCT", "PLT" in the last 48 hours., and A1C:   Recent Labs   Lab 10/28/24  0357   HGBA1C 7.7*     Radiology: X-Ray:  X-rays show fracture is in good alignment    Pending Diagnostic Studies:       None           Medications:  Reconciled Home Medications:      Medication List        CONTINUE taking these medications      aspirin 81 MG EC tablet  Commonly known as: ECOTRIN  Take 81 mg by mouth once daily.     bisacodyL 5 mg EC tablet  Commonly known as: DULCOLAX  Take 5 mg by mouth daily as needed for Constipation.     carvediloL 12.5 MG tablet  Commonly known as: COREG  Take 12.5 mg by mouth 2 (two) times daily.     clopidogreL 75 mg tablet  Commonly known as: PLAVIX  Take 75 mg by mouth once daily.     furosemide 20 MG tablet  Commonly known as: LASIX  Take 20 mg by mouth once daily.     hydrALAZINE 25 MG tablet  Commonly known as: APRESOLINE  Take 25 mg by mouth 3 (three) times daily.     NovoLOG Mix 70-30FlexPen U-100 100 unit/mL (70-30) Inpn pen  Generic drug: insulin aspart protamine-insulin aspart  Inject into the skin.   Inject ten Units under the skin in the morning and ten Units in the evening. Inject with meals.     potassium chloride 10 MEQ Cpsr  Commonly known as: MICRO-K  Take 10 mEq by mouth once daily.     rosuvastatin 40 MG Tab  Commonly known as: CRESTOR  Take 20 mg by mouth every " evening.     ZETIA 10 mg tablet  Generic drug: ezetimibe  Take 10 mg by mouth once daily.              Indwelling Lines/Drains at time of discharge:   Lines/Drains/Airways       None                   Time spent on the discharge of patient: 35 minutes         Omer Mukherjee DO  Department of Hospital Medicine  [facility] - Medical Surgical Unit

## 2024-11-07 NOTE — HOSPITAL COURSE
Patient admitted to the hospital for fractured distal end of her left radius and ulna and to participate in physical therapy and occupational therapy to allow her to go home she might perform her activities of daily living.  Patient has done well.  Labs have all been within normal limits.  Patient's blood sugars have been controlled.  Patient is to be followed in 1 week by her primary care doctor.  Ortho follow-up in 1 week.

## 2024-11-07 NOTE — NURSING
Dr Mckinley's nurse has returned my call from yesterday with orders to leave dressing/splint intact and not to remove. Ace dressing/splint remains D/C//I to the left lower arm

## 2024-11-07 NOTE — NURSING
Dr Mckinley's office called and talked with clinic personel. Message left for nurse to return my call reguarding wound care to skin tear and incision site.

## 2024-11-07 NOTE — ASSESSMENT & PLAN NOTE
Patient with known CAD s/p stent placement and CABG, which is controlled Will continue ASA, Plavix, and Statin and monitor for S/Sx of angina/ACS. Continue to monitor on telemetry.  Patient discharged in stable condition without incident

## 2024-11-07 NOTE — PT/OT/SLP PROGRESS
Occupational Therapy   Treatment    Name: Jayme Mcgraw  MRN: [id number]  Admitting Diagnosis:  Fracture of distal end of left radius and ulna, closed, initial encounter       Recommendations:     Discharge Recommendations: Low Intensity Therapy  Discharge Equipment Recommendations:  walker, [place]  Barriers to discharge:  None    Assessment:     Jayme Mcgraw is a 89 y.o. female with a medical diagnosis of Fracture of distal end of left radius and ulna, closed, initial encounter.  She presents with weakness. Performance deficits affecting function are weakness, impaired endurance, impaired self care skills, decreased upper extremity function, decreased ROM, impaired fine motor, orthopedic precautions.   Pt continues to progress well and plan is D/C tomorrow home with daughter.    Rehab Prognosis:  Good; patient would benefit from acute skilled OT services to address these deficits and reach maximum level of function.       Plan:     Patient to be seen 5 x/week to address the above listed problems via self-care/home management, therapeutic activities, therapeutic exercises, neuromuscular re-education  Plan of Care Expires: 11/22/24  Plan of Care Reviewed with: patient    Subjective     Chief Complaint: Pt had no complaints  Patient/Family Comments/goals: return home  Pain/Comfort:       Objective:     Communicated with: nursing prior to session.  Patient found HOB elevated with   upon OT entry to room.    General Precautions: Standard, fall    Orthopedic Precautions:   Braces: UE Sling  Respiratory Status: Room air     Occupational Performance:     Bed Mobility:        Functional Mobility/Transfers:      Treatment & Education:  Pt completed RUE elbow flexion with 2# DB 2x10 reps, chest press and shld flex with 1# DB 2x10 reps and tricep press with red Tband 2x15 ea ex to increase strength.   AROM with LUE in elbow flexion/extension and shoulder flexion and horizontal adduction 2x15 repetitions each, and digit flex/ext  2x25 repetitions to assist functional mobility     Therapeutic activities:  While sitting, pt engaged in FMC activity completing clothes pins ladder using right arm for 1,2,4,6,8# resistance clothes pins clipping on ladder, to increase overall tolerance and strength in order to complete ADLs with less assistance.       Patient left up in chair with  PTA present    GOALS:   Multidisciplinary Problems       Occupational Therapy Goals          Problem: Occupational Therapy    Goal Priority Disciplines Outcome Interventions   Occupational Therapy Goal     OT, PT/OT Progressing    Description: STG:  Pt will perform grooming with Min A  Pt will bathe self with Mod A  Pt will perform UB dressing with setup  Pt will perform LB dressing with Mod A  Pt will transfer to toilet with Min A    LTG:  (to be Met by Discharge)  Pt will perform grooming with setup  Pt will bathe self with Min A  Pt will perform UB dressing with Ind  Pt will perform LB dressing with Min A  Pt will transfer to toilet with SBA                       Time Tracking:     OT Date of Treatment: 11/07/24  OT Start Time: 1347  OT Stop Time: 1430  OT Total Time (min): 43 min    Billable Minutes:Therapeutic Activity 8  Therapeutic Exercise 35    OT/SHEN: OT          11/7/2024

## 2024-11-07 NOTE — ASSESSMENT & PLAN NOTE
Patient had fall in PTOT to establish that she is able to take care of herself home.  Patient is not steady at times and needs but hands and currently she is left wrist in a splint or cast.    Patient has done well in physical therapy and walking over 150 ft.  She is able to maintain her activities of daily living.  Discharged in stable condition.  She will have home health i.e. [facility]

## 2024-11-07 NOTE — ASSESSMENT & PLAN NOTE
Patients blood pressure range in the last 24 hours was: BP  Min: 91/49  Max: 195/80.The patient's inpatient anti-hypertensive regimen is listed below:  Current Antihypertensives  carvediloL tablet 12.5 mg, 2 times daily, Oral  furosemide tablet 20 mg, Daily, Oral    Plan  - BP is controlled, no changes needed to their regimen    Patient is discharged with blood pressure well controlled on her carvedilol.  -

## 2024-11-07 NOTE — PT/OT/SLP PROGRESS
Physical Therapy Treatment    Patient Name:  Jayme Mcgraw   MRN:  [id number]    Recommendations:     Discharge Recommendations: Low Intensity Therapy  Discharge Equipment Recommendations: walker, [place]  Barriers to discharge: Inaccessible home and Decreased caregiver support    Assessment:     Jayme Mcgraw is a 89 y.o. female admitted with a medical diagnosis of Fracture of distal end of left radius and ulna, closed, initial encounter.  She presents with the following impairments/functional limitations: weakness, gait instability, decreased upper extremity function, decreased lower extremity function, impaired balance, impaired functional mobility.    Rehab Prognosis: Good; patient would benefit from acute skilled PT services to address these deficits and reach maximum level of function.    Recent Surgery: * No surgery found *      Plan:     During this hospitalization, patient to be seen 5 x/week to address the identified rehab impairments via gait training, therapeutic activities, therapeutic exercises and progress toward the following goals:    Plan of Care Expires:  11/30/24    Subjective     Chief Complaint: sore  Patient/Family Comments/goals: Pt. With no new complaints, is agreeable to PT.   Pain/Comfort:  Pain Rating 1:  (Pt. reports some soreness in LEs today, states it's nothing to complain about.)      Objective:     Communicated with OT prior to session.  Patient found up in chair with   upon PT entry to room.     General Precautions: Standard, fall  Orthopedic Precautions: LUE non weight bearing  Braces: N/A  Respiratory Status: Room air     Functional Mobility:  Transfers:     Sit to Stand:  stand by assistance and contact guard assistance with quad cane  Gait: Pt. Amb. With NBQC and CGA to Min A x 55 feet and x 88 feet with seated rest in between boughts.  Balance: Pt. Performed Standing Hip Extensions with single HHA on Rail x 10 reps each bilateral with TC for upright posture      AM-PAC 6 CLICK  MOBILITY  Turning over in bed (including adjusting bedclothes, sheets and blankets)?: 3  Sitting down on and standing up from a chair with arms (e.g., wheelchair, bedside commode, etc.): 3  Moving from lying on back to sitting on the side of the bed?: 3  Moving to and from a bed to a chair (including a wheelchair)?: 3  Need to walk in hospital room?: 3  Climbing 3-5 steps with a railing?: 3  Basic Mobility Total Score: 18       Treatment & Education:  Pt. Participated in mobility per above and LE PREs in sitting: LAQs and Marching 2 x 10 with 2# cw B, Hip ABD x 20 reps with GRN TB, HS cursl x 20 Each B with Red TB.    Patient left up in chair with call button in reach and tray table presentbeside chair.    GOALS:   Multidisciplinary Problems       Physical Therapy Goals          Problem: Physical Therapy    Goal Priority Disciplines Outcome Interventions   Physical Therapy Goal     PT, PT/OT Progressing    Description: Short Term Goals to be met by: 24    Patient will increase functional independence with mobility by performin. Supine to sit with Stand by assist  2. Sit to stand transfer with contact guard assist using Roderick-walker  3. Bed to chair transfer with contact guard assist using Roderick-walker  4. Gait  x 100 feet with contact guard assist using lowest level of assistive device  5. Lower extremity exercise program x30 reps per handout, with assistance as needed    Long Term Goals to be met by: 24    Pt will regain full independent functional mobility with lowest level of assistive device to return to home situation and prior activities of daily living.                        Time Tracking:     PT Received On: 24  PT Start Time: 1436     PT Stop Time: 1507  PT Total Time (min): 31 min     Billable Minutes: Gait Training 15 and Therapeutic Exercise 15    Treatment Type: Treatment  PT/PTA: PTA     Number of PTA visits since last PT visit: 3     2024

## 2024-11-08 VITALS
OXYGEN SATURATION: 97 % | SYSTOLIC BLOOD PRESSURE: 145 MMHG | HEIGHT: 61 IN | TEMPERATURE: 98 F | DIASTOLIC BLOOD PRESSURE: 65 MMHG | BODY MASS INDEX: 26.09 KG/M2 | RESPIRATION RATE: 17 BRPM | HEART RATE: 54 BPM | WEIGHT: 138.19 LBS

## 2024-11-08 LAB
ANION GAP SERPL CALCULATED.3IONS-SCNC: 2 MMOL/L (ref 7–16)
BASOPHILS # BLD AUTO: 0.01 K/UL (ref 0–0.2)
BASOPHILS NFR BLD AUTO: 0.2 % (ref 0–1)
BUN SERPL-MCNC: 13 MG/DL (ref 7–18)
BUN/CREAT SERPL: 11 (ref 6–20)
CALCIUM SERPL-MCNC: 8.9 MG/DL (ref 8.5–10.1)
CHLORIDE SERPL-SCNC: 102 MMOL/L (ref 98–107)
CO2 SERPL-SCNC: 34 MMOL/L (ref 21–32)
CREAT SERPL-MCNC: 1.18 MG/DL (ref 0.55–1.02)
DIFFERENTIAL METHOD BLD: ABNORMAL
EGFR (NO RACE VARIABLE) (RUSH/TITUS): 43 ML/MIN/1.73M2
EOSINOPHIL # BLD AUTO: 0.07 K/UL (ref 0–0.5)
EOSINOPHIL NFR BLD AUTO: 1.4 % (ref 1–4)
ERYTHROCYTE [DISTWIDTH] IN BLOOD BY AUTOMATED COUNT: 13.2 % (ref 11.5–14.5)
GLUCOSE SERPL-MCNC: 150 MG/DL (ref 74–106)
GLUCOSE SERPL-MCNC: 156 MG/DL (ref 70–105)
GLUCOSE SERPL-MCNC: 227 MG/DL (ref 70–105)
HCT VFR BLD AUTO: 32.5 % (ref 38–47)
HGB BLD-MCNC: 10.3 G/DL (ref 12–16)
LYMPHOCYTES # BLD AUTO: 1.45 K/UL (ref 1–4.8)
LYMPHOCYTES NFR BLD AUTO: 29.4 % (ref 27–41)
MAGNESIUM SERPL-MCNC: 1.9 MG/DL (ref 1.7–2.3)
MCH RBC QN AUTO: 29.3 PG (ref 27–31)
MCHC RBC AUTO-ENTMCNC: 31.7 G/DL (ref 32–36)
MCV RBC AUTO: 92.6 FL (ref 80–96)
MONOCYTES # BLD AUTO: 0.45 K/UL (ref 0–0.8)
MONOCYTES NFR BLD AUTO: 9.1 % (ref 2–6)
MPC BLD CALC-MCNC: 9.5 FL (ref 9.4–12.4)
NEUTROPHILS # BLD AUTO: 2.96 K/UL (ref 1.8–7.7)
NEUTROPHILS NFR BLD AUTO: 59.9 % (ref 53–65)
PHOSPHATE SERPL-MCNC: 3.7 MG/DL (ref 2.5–4.5)
PLATELET # BLD AUTO: 281 K/UL (ref 150–400)
POTASSIUM SERPL-SCNC: 4.6 MMOL/L (ref 3.5–5.1)
RBC # BLD AUTO: 3.51 M/UL (ref 4.2–5.4)
SODIUM SERPL-SCNC: 133 MMOL/L (ref 136–145)
WBC # BLD AUTO: 4.94 K/UL (ref 4.5–11)

## 2024-11-08 PROCEDURE — 82962 GLUCOSE BLOOD TEST: CPT

## 2024-11-08 PROCEDURE — 63600175 PHARM REV CODE 636 W HCPCS: Performed by: NURSE PRACTITIONER

## 2024-11-08 PROCEDURE — 80048 BASIC METABOLIC PNL TOTAL CA: CPT | Performed by: NURSE PRACTITIONER

## 2024-11-08 PROCEDURE — 83735 ASSAY OF MAGNESIUM: CPT | Performed by: NURSE PRACTITIONER

## 2024-11-08 PROCEDURE — 36415 COLL VENOUS BLD VENIPUNCTURE: CPT | Performed by: NURSE PRACTITIONER

## 2024-11-08 PROCEDURE — 84100 ASSAY OF PHOSPHORUS: CPT | Performed by: NURSE PRACTITIONER

## 2024-11-08 PROCEDURE — 27000944

## 2024-11-08 PROCEDURE — 85025 COMPLETE CBC W/AUTO DIFF WBC: CPT | Performed by: NURSE PRACTITIONER

## 2024-11-08 PROCEDURE — 94761 N-INVAS EAR/PLS OXIMETRY MLT: CPT

## 2024-11-08 PROCEDURE — 63600175 PHARM REV CODE 636 W HCPCS: Performed by: FAMILY MEDICINE

## 2024-11-08 PROCEDURE — 25000003 PHARM REV CODE 250: Performed by: NURSE PRACTITIONER

## 2024-11-08 RX ADMIN — ASPIRIN 81 MG: 81 TABLET, COATED ORAL at 08:11

## 2024-11-08 RX ADMIN — POTASSIUM CHLORIDE 20 MEQ: 1500 TABLET, EXTENDED RELEASE ORAL at 08:11

## 2024-11-08 RX ADMIN — FUROSEMIDE 20 MG: 20 TABLET ORAL at 08:11

## 2024-11-08 RX ADMIN — CLOPIDOGREL BISULFATE 75 MG: 75 TABLET ORAL at 08:11

## 2024-11-08 RX ADMIN — CARVEDILOL 12.5 MG: 6.25 TABLET, FILM COATED ORAL at 08:11

## 2024-11-08 RX ADMIN — INSULIN ASPART 2 UNITS: 100 INJECTION, SOLUTION INTRAVENOUS; SUBCUTANEOUS at 11:11

## 2024-11-08 RX ADMIN — ACETAMINOPHEN 650 MG: 325 TABLET ORAL at 03:11

## 2024-11-08 RX ADMIN — SENNOSIDES AND DOCUSATE SODIUM 1 TABLET: 50; 8.6 TABLET ORAL at 08:11

## 2024-11-08 RX ADMIN — INSULIN ASPART 10 UNITS: 100 INJECTION, SUSPENSION SUBCUTANEOUS at 06:11

## 2024-11-08 NOTE — DISCHARGE INSTRUCTIONS
Continue with medications as listed. Keep dressing to left arm clean and dry until follow up appointment with Dr Mckinley 11/14/24 at 9:30a.m.

## 2024-11-08 NOTE — NURSING
Pt discharged to home with family via POV. Instructions and follow up appts given to family. Voiced understanding.

## 2024-11-08 NOTE — PLAN OF CARE
Problem: Adult Inpatient Plan of Care  Goal: Plan of Care Review  11/8/2024 1116 by Corey Rick RN  Outcome: Met  11/8/2024 1108 by Corey Rick RN  Outcome: Progressing  Goal: Patient-Specific Goal (Individualized)  11/8/2024 1116 by Corey Rick RN  Outcome: Met  11/8/2024 1108 by Corey Rick RN  Outcome: Progressing  Goal: Absence of Hospital-Acquired Illness or Injury  11/8/2024 1116 by Corey Rick RN  Outcome: Met  11/8/2024 1108 by Corey Rick RN  Outcome: Progressing  Goal: Optimal Comfort and Wellbeing  11/8/2024 1116 by Corey Rick RN  Outcome: Met  11/8/2024 1108 by Corey Rick RN  Outcome: Progressing  Goal: Readiness for Transition of Care  11/8/2024 1116 by Corey Rick RN  Outcome: Met  11/8/2024 1108 by Corey Rick RN  Outcome: Progressing     Problem: Diabetes Comorbidity  Goal: Blood Glucose Level Within Targeted Range  11/8/2024 1116 by Corey Rick RN  Outcome: Met  11/8/2024 1108 by Corey Rick RN  Outcome: Progressing     Problem: Wound  Goal: Optimal Coping  11/8/2024 1116 by Corey Rick RN  Outcome: Met  11/8/2024 1108 by Corey Rick RN  Outcome: Progressing  Goal: Optimal Functional Ability  11/8/2024 1116 by Corey Rick RN  Outcome: Met  11/8/2024 1108 by Corey Rick RN  Outcome: Progressing  Goal: Absence of Infection Signs and Symptoms  11/8/2024 1116 by Corey Rick RN  Outcome: Met  11/8/2024 1108 by Corey Rick RN  Outcome: Progressing  Goal: Improved Oral Intake  11/8/2024 1116 by Corey iRck RN  Outcome: Met  11/8/2024 1108 by Corey Rick RN  Outcome: Progressing  Goal: Optimal Pain Control and Function  11/8/2024 1116 by Corey Rick RN  Outcome: Met  11/8/2024 1108 by Corey Rick RN  Outcome: Progressing  Goal: Skin Health and Integrity  11/8/2024 1116 by Corey Rick, RN  Outcome: Met  11/8/2024 1108 by Corey Rick RN  Outcome: Progressing  Goal:  Optimal Wound Healing  11/8/2024 1116 by Corey Rick RN  Outcome: Met  11/8/2024 1108 by Corey Rick RN  Outcome: Progressing     Problem: Pain Acute  Goal: Optimal Pain Control and Function  11/8/2024 1116 by Corey Rick RN  Outcome: Met  11/8/2024 1108 by Corey Rick RN  Outcome: Progressing     Problem: Fall Injury Risk  Goal: Absence of Fall and Fall-Related Injury  11/8/2024 1116 by Corey Rick RN  Outcome: Met  11/8/2024 1108 by Corey Rick RN  Outcome: Progressing     Problem: Gas Exchange Impaired  Goal: Optimal Gas Exchange  11/8/2024 1116 by Corey Rick RN  Outcome: Met  11/8/2024 1108 by Corey Rick RN  Outcome: Progressing

## 2024-11-13 LAB
OHS QRS DURATION: 132 MS
OHS QTC CALCULATION: 443 MS

## (undated) DEVICE — GLOVE SENSICARE PI GRN 8.5

## (undated) DEVICE — BANDAGE ESMARK 4INX3YD

## (undated) DEVICE — DRESSING GAUZE XEROFORM 5X9

## (undated) DEVICE — SET CYSTO IRR DRP CHMBR 84IN

## (undated) DEVICE — SUT CTD VICRYL 0 UND BR

## (undated) DEVICE — GOWN POLY REINF BRTH SLV XL

## (undated) DEVICE — TRAY SKIN SCRUB WET 4 COMPART

## (undated) DEVICE — SUT CTD VICRYL 3-0 CR/SH

## (undated) DEVICE — BAG RECTANGLE RBBRBND 30X36IN

## (undated) DEVICE — PADDING WYTEX UNDRCST 2INX4YD

## (undated) DEVICE — TOURNIQUET SB QC SP 18X4IN

## (undated) DEVICE — SUT 2/0 36IN COATED VICRYL

## (undated) DEVICE — APPLICATOR CHLORAPREP ORN 26ML

## (undated) DEVICE — BIT DRILL QC 1.8X110MM

## (undated) DEVICE — Device

## (undated) DEVICE — BIT DRILL 2.0.

## (undated) DEVICE — SLING ARM LARGE FOAM STRAP

## (undated) DEVICE — CAST SMALL OR FROM CAST CART

## (undated) DEVICE — SUT ETHILON 4-0 PS2 18 BLK

## (undated) DEVICE — SOCKINETTE DOUBLE PLY 4X48IN

## (undated) DEVICE — K-WIRE TRCR PT1.25MM D 150MM L
Type: IMPLANTABLE DEVICE | Site: WRIST | Status: NON-FUNCTIONAL
Removed: 2024-10-28

## (undated) DEVICE — GOWN IMPERVIOUS BLUE XXL

## (undated) DEVICE — GLOVE SENSICARE PI SURG 7.5

## (undated) DEVICE — GLOVE SENSICARE PI GRN 8

## (undated) DEVICE — GLOVE SENSICARE PI SURG 8.5